# Patient Record
Sex: MALE | Race: WHITE | NOT HISPANIC OR LATINO
[De-identification: names, ages, dates, MRNs, and addresses within clinical notes are randomized per-mention and may not be internally consistent; named-entity substitution may affect disease eponyms.]

---

## 2017-02-03 ENCOUNTER — LABORATORY RESULT (OUTPATIENT)
Age: 62
End: 2017-02-03

## 2017-02-03 ENCOUNTER — APPOINTMENT (OUTPATIENT)
Dept: INTERNAL MEDICINE | Facility: CLINIC | Age: 62
End: 2017-02-03

## 2017-02-03 VITALS
TEMPERATURE: 97.4 F | DIASTOLIC BLOOD PRESSURE: 79 MMHG | RESPIRATION RATE: 14 BRPM | SYSTOLIC BLOOD PRESSURE: 135 MMHG | WEIGHT: 205 LBS | HEIGHT: 70 IN | BODY MASS INDEX: 29.35 KG/M2 | HEART RATE: 80 BPM | OXYGEN SATURATION: 97 %

## 2017-02-03 DIAGNOSIS — Z11.4 ENCOUNTER FOR SCREENING FOR HUMAN IMMUNODEFICIENCY VIRUS [HIV]: ICD-10-CM

## 2017-02-07 LAB
ALBUMIN SERPL ELPH-MCNC: 4.1 G/DL
ALP BLD-CCNC: 45 U/L
ALT SERPL-CCNC: 19 U/L
ANION GAP SERPL CALC-SCNC: 16 MMOL/L
AST SERPL-CCNC: 20 U/L
BASOPHILS # BLD AUTO: 0 K/UL
BASOPHILS NFR BLD AUTO: 0 %
BILIRUB SERPL-MCNC: 0.6 MG/DL
BUN SERPL-MCNC: 17 MG/DL
CALCIUM SERPL-MCNC: 9.2 MG/DL
CHLORIDE SERPL-SCNC: 101 MMOL/L
CHOLEST SERPL-MCNC: 155 MG/DL
CHOLEST/HDLC SERPL: 2.5 RATIO
CO2 SERPL-SCNC: 22 MMOL/L
CREAT SERPL-MCNC: 1.06 MG/DL
CREAT SPEC-SCNC: 131 MG/DL
EOSINOPHIL # BLD AUTO: 0.12 K/UL
EOSINOPHIL NFR BLD AUTO: 3 %
GLUCOSE SERPL-MCNC: 108 MG/DL
HBA1C MFR BLD HPLC: 5.9 %
HCT VFR BLD CALC: 42.3 %
HDLC SERPL-MCNC: 63 MG/DL
HGB BLD-MCNC: 13.8 G/DL
HIV1+2 AB SPEC QL IA.RAPID: NONREACTIVE
LDLC SERPL CALC-MCNC: 75 MG/DL
LYMPHOCYTES # BLD AUTO: 1.88 K/UL
LYMPHOCYTES NFR BLD AUTO: 46 %
MAN DIFF?: NORMAL
MCHC RBC-ENTMCNC: 28.3 PG
MCHC RBC-ENTMCNC: 32.6 GM/DL
MCV RBC AUTO: 86.7 FL
MICROALBUMIN 24H UR DL<=1MG/L-MCNC: 0.8 MG/DL
MICROALBUMIN/CREAT 24H UR-RTO: 6 UG/MG
MONOCYTES # BLD AUTO: 0.37 K/UL
MONOCYTES NFR BLD AUTO: 9 %
NEUTROPHILS # BLD AUTO: 1.71 K/UL
NEUTROPHILS NFR BLD AUTO: 42 %
PLATELET # BLD AUTO: 172 K/UL
POTASSIUM SERPL-SCNC: 4.2 MMOL/L
PROT SERPL-MCNC: 6.6 G/DL
RBC # BLD: 4.88 M/UL
RBC # FLD: 14.2 %
SODIUM SERPL-SCNC: 139 MMOL/L
TRIGL SERPL-MCNC: 85 MG/DL
WBC # FLD AUTO: 4.08 K/UL

## 2017-04-28 ENCOUNTER — APPOINTMENT (OUTPATIENT)
Dept: INTERNAL MEDICINE | Facility: CLINIC | Age: 62
End: 2017-04-28

## 2017-04-28 VITALS
SYSTOLIC BLOOD PRESSURE: 150 MMHG | OXYGEN SATURATION: 98 % | DIASTOLIC BLOOD PRESSURE: 87 MMHG | WEIGHT: 201 LBS | HEIGHT: 70 IN | RESPIRATION RATE: 14 BRPM | BODY MASS INDEX: 28.77 KG/M2 | HEART RATE: 66 BPM | TEMPERATURE: 97.7 F

## 2017-04-28 DIAGNOSIS — Z86.59 PERSONAL HISTORY OF OTHER MENTAL AND BEHAVIORAL DISORDERS: ICD-10-CM

## 2017-05-25 ENCOUNTER — APPOINTMENT (OUTPATIENT)
Dept: HEART AND VASCULAR | Facility: CLINIC | Age: 62
End: 2017-05-25

## 2017-05-25 VITALS
HEART RATE: 62 BPM | DIASTOLIC BLOOD PRESSURE: 70 MMHG | SYSTOLIC BLOOD PRESSURE: 110 MMHG | HEIGHT: 70 IN | WEIGHT: 192 LBS | BODY MASS INDEX: 27.49 KG/M2

## 2017-05-25 VITALS — WEIGHT: 202 LBS | BODY MASS INDEX: 28.98 KG/M2

## 2017-05-26 LAB
ANION GAP SERPL CALC-SCNC: 16 MMOL/L
BUN SERPL-MCNC: 21 MG/DL
CALCIUM SERPL-MCNC: 9.9 MG/DL
CHLORIDE SERPL-SCNC: 102 MMOL/L
CHOLEST SERPL-MCNC: 179 MG/DL
CHOLEST/HDLC SERPL: 3 RATIO
CO2 SERPL-SCNC: 22 MMOL/L
CREAT SERPL-MCNC: 1.17 MG/DL
GLUCOSE SERPL-MCNC: 120 MG/DL
HBA1C MFR BLD HPLC: 5.9 %
HDLC SERPL-MCNC: 59 MG/DL
LDLC SERPL CALC-MCNC: 97 MG/DL
POTASSIUM SERPL-SCNC: 4.4 MMOL/L
SODIUM SERPL-SCNC: 140 MMOL/L
TRIGL SERPL-MCNC: 115 MG/DL

## 2017-06-28 ENCOUNTER — APPOINTMENT (OUTPATIENT)
Dept: INTERNAL MEDICINE | Facility: CLINIC | Age: 62
End: 2017-06-28

## 2017-06-28 VITALS
SYSTOLIC BLOOD PRESSURE: 130 MMHG | DIASTOLIC BLOOD PRESSURE: 80 MMHG | OXYGEN SATURATION: 97 % | TEMPERATURE: 97.4 F | RESPIRATION RATE: 14 BRPM | HEART RATE: 60 BPM | HEIGHT: 70 IN | BODY MASS INDEX: 28.63 KG/M2 | WEIGHT: 200 LBS

## 2017-06-28 DIAGNOSIS — R73.09 OTHER ABNORMAL GLUCOSE: ICD-10-CM

## 2017-09-28 ENCOUNTER — RX RENEWAL (OUTPATIENT)
Age: 62
End: 2017-09-28

## 2017-09-29 ENCOUNTER — RX RENEWAL (OUTPATIENT)
Age: 62
End: 2017-09-29

## 2017-11-10 ENCOUNTER — APPOINTMENT (OUTPATIENT)
Dept: HEART AND VASCULAR | Facility: CLINIC | Age: 62
End: 2017-11-10
Payer: COMMERCIAL

## 2017-11-10 VITALS
BODY MASS INDEX: 29.49 KG/M2 | HEIGHT: 70 IN | SYSTOLIC BLOOD PRESSURE: 110 MMHG | WEIGHT: 206 LBS | HEART RATE: 63 BPM | DIASTOLIC BLOOD PRESSURE: 60 MMHG

## 2017-11-10 DIAGNOSIS — R07.89 OTHER CHEST PAIN: ICD-10-CM

## 2017-11-10 PROCEDURE — 99214 OFFICE O/P EST MOD 30 MIN: CPT | Mod: 25

## 2017-11-10 PROCEDURE — 93000 ELECTROCARDIOGRAM COMPLETE: CPT

## 2017-11-12 PROBLEM — R07.89 CHEST DISCOMFORT: Status: ACTIVE | Noted: 2017-11-12

## 2017-11-12 LAB
ANION GAP SERPL CALC-SCNC: 18 MMOL/L
BUN SERPL-MCNC: 22 MG/DL
CALCIUM SERPL-MCNC: 9.5 MG/DL
CHLORIDE SERPL-SCNC: 102 MMOL/L
CHOLEST SERPL-MCNC: 164 MG/DL
CHOLEST/HDLC SERPL: 2.9 RATIO
CO2 SERPL-SCNC: 22 MMOL/L
CREAT SERPL-MCNC: 1.22 MG/DL
GLUCOSE SERPL-MCNC: 123 MG/DL
HBA1C MFR BLD HPLC: 5.8 %
HDLC SERPL-MCNC: 56 MG/DL
HIV1+2 AB SPEC QL IA.RAPID: NONREACTIVE
LDLC SERPL CALC-MCNC: 90 MG/DL
POTASSIUM SERPL-SCNC: 4.4 MMOL/L
SODIUM SERPL-SCNC: 142 MMOL/L
TRIGL SERPL-MCNC: 91 MG/DL

## 2017-12-10 ENCOUNTER — TRANSCRIPTION ENCOUNTER (OUTPATIENT)
Age: 62
End: 2017-12-10

## 2017-12-15 ENCOUNTER — APPOINTMENT (OUTPATIENT)
Dept: INTERNAL MEDICINE | Facility: CLINIC | Age: 62
End: 2017-12-15
Payer: COMMERCIAL

## 2017-12-15 VITALS
WEIGHT: 206 LBS | SYSTOLIC BLOOD PRESSURE: 130 MMHG | HEART RATE: 58 BPM | OXYGEN SATURATION: 97 % | HEIGHT: 70 IN | RESPIRATION RATE: 14 BRPM | DIASTOLIC BLOOD PRESSURE: 70 MMHG | BODY MASS INDEX: 29.49 KG/M2

## 2017-12-15 PROCEDURE — 99396 PREV VISIT EST AGE 40-64: CPT | Mod: 25

## 2017-12-15 PROCEDURE — 36415 COLL VENOUS BLD VENIPUNCTURE: CPT

## 2017-12-15 PROCEDURE — 90471 IMMUNIZATION ADMIN: CPT

## 2017-12-15 PROCEDURE — 90686 IIV4 VACC NO PRSV 0.5 ML IM: CPT

## 2017-12-17 LAB
HCV AB SER QL: NONREACTIVE
HCV S/CO RATIO: 0.25 S/CO

## 2018-01-10 ENCOUNTER — APPOINTMENT (OUTPATIENT)
Dept: OPHTHALMOLOGY | Facility: CLINIC | Age: 63
End: 2018-01-10
Payer: COMMERCIAL

## 2018-01-10 DIAGNOSIS — H43.391 OTHER VITREOUS OPACITIES, RIGHT EYE: ICD-10-CM

## 2018-01-10 DIAGNOSIS — H26.9 UNSPECIFIED CATARACT: ICD-10-CM

## 2018-01-10 PROCEDURE — 92225: CPT | Mod: RT

## 2018-01-10 PROCEDURE — 92014 COMPRE OPH EXAM EST PT 1/>: CPT

## 2018-02-05 ENCOUNTER — RX RENEWAL (OUTPATIENT)
Age: 63
End: 2018-02-05

## 2018-03-16 DIAGNOSIS — Z92.89 PERSONAL HISTORY OF OTHER MEDICAL TREATMENT: ICD-10-CM

## 2018-03-19 PROBLEM — Z92.89 HISTORY OF STRESS TEST: Status: ACTIVE | Noted: 2018-03-19

## 2018-03-30 ENCOUNTER — APPOINTMENT (OUTPATIENT)
Dept: HEART AND VASCULAR | Facility: CLINIC | Age: 63
End: 2018-03-30
Payer: COMMERCIAL

## 2018-03-30 VITALS
WEIGHT: 204 LBS | DIASTOLIC BLOOD PRESSURE: 80 MMHG | BODY MASS INDEX: 29.2 KG/M2 | SYSTOLIC BLOOD PRESSURE: 140 MMHG | HEART RATE: 57 BPM | HEIGHT: 70 IN

## 2018-03-30 VITALS — SYSTOLIC BLOOD PRESSURE: 120 MMHG | DIASTOLIC BLOOD PRESSURE: 84 MMHG

## 2018-03-30 DIAGNOSIS — I51.89 OTHER ILL-DEFINED HEART DISEASES: ICD-10-CM

## 2018-03-30 PROCEDURE — 99213 OFFICE O/P EST LOW 20 MIN: CPT

## 2018-03-30 PROCEDURE — 93000 ELECTROCARDIOGRAM COMPLETE: CPT

## 2018-03-30 PROCEDURE — 36415 COLL VENOUS BLD VENIPUNCTURE: CPT

## 2018-03-30 RX ORDER — MUPIROCIN 20 MG/G
2 OINTMENT TOPICAL
Qty: 22 | Refills: 0 | Status: DISCONTINUED | COMMUNITY
Start: 2017-11-16

## 2018-04-01 LAB
ANION GAP SERPL CALC-SCNC: 13 MMOL/L
BUN SERPL-MCNC: 17 MG/DL
CALCIUM SERPL-MCNC: 9.7 MG/DL
CHLORIDE SERPL-SCNC: 100 MMOL/L
CHOLEST SERPL-MCNC: 170 MG/DL
CHOLEST/HDLC SERPL: 3 RATIO
CO2 SERPL-SCNC: 27 MMOL/L
CREAT SERPL-MCNC: 1.06 MG/DL
GLUCOSE SERPL-MCNC: 118 MG/DL
HBA1C MFR BLD HPLC: 5.9 %
HDLC SERPL-MCNC: 57 MG/DL
HIV1+2 AB SPEC QL IA.RAPID: NONREACTIVE
LDLC SERPL CALC-MCNC: 85 MG/DL
POTASSIUM SERPL-SCNC: 4.4 MMOL/L
SODIUM SERPL-SCNC: 140 MMOL/L
TRIGL SERPL-MCNC: 142 MG/DL

## 2018-05-22 ENCOUNTER — APPOINTMENT (OUTPATIENT)
Dept: INTERNAL MEDICINE | Facility: CLINIC | Age: 63
End: 2018-05-22
Payer: COMMERCIAL

## 2018-05-22 VITALS
WEIGHT: 200 LBS | TEMPERATURE: 97.6 F | DIASTOLIC BLOOD PRESSURE: 60 MMHG | HEART RATE: 69 BPM | BODY MASS INDEX: 28.63 KG/M2 | HEIGHT: 70 IN | OXYGEN SATURATION: 98 % | RESPIRATION RATE: 14 BRPM | SYSTOLIC BLOOD PRESSURE: 120 MMHG

## 2018-05-22 PROCEDURE — 99214 OFFICE O/P EST MOD 30 MIN: CPT

## 2018-05-22 NOTE — PLAN
[FreeTextEntry1] : \par Back pain - exercises reviewed in detil and handout gien\par tylenol and aleve mainstay of prescription tx\par  tramadol for severe pain = reviewed potential aes \par \par BP is controlled and contine current regimen

## 2018-05-22 NOTE — REVIEW OF SYSTEMS
[Back Pain] : back pain [Negative] : Integumentary [Joint Pain] : no joint pain [de-identified] : as above

## 2018-05-22 NOTE — HISTORY OF PRESENT ILLNESS
[FreeTextEntry1] : back pain [de-identified] : right sided pain radiating down buttock to right leg to foot.\par saw chiropractor - did not help\par this began 10 d ago. throbbing tingling . numb, pain.  pain at night as well.  nothing on left.\par deniese any trauma,  never had this before.\par Taking aleve bid right now. - does not help

## 2018-05-22 NOTE — PHYSICAL EXAM
[No Acute Distress] : no acute distress [Well Nourished] : well nourished [Well Developed] : well developed [Well-Appearing] : well-appearing

## 2018-07-11 ENCOUNTER — APPOINTMENT (OUTPATIENT)
Dept: MRI IMAGING | Facility: CLINIC | Age: 63
End: 2018-07-11
Payer: COMMERCIAL

## 2018-07-11 ENCOUNTER — OUTPATIENT (OUTPATIENT)
Dept: OUTPATIENT SERVICES | Facility: HOSPITAL | Age: 63
LOS: 1 days | End: 2018-07-11
Payer: COMMERCIAL

## 2018-07-11 PROCEDURE — 72148 MRI LUMBAR SPINE W/O DYE: CPT

## 2018-07-11 PROCEDURE — 72148 MRI LUMBAR SPINE W/O DYE: CPT | Mod: 26

## 2018-07-12 ENCOUNTER — APPOINTMENT (OUTPATIENT)
Dept: NEUROLOGY | Facility: CLINIC | Age: 63
End: 2018-07-12
Payer: COMMERCIAL

## 2018-07-12 VITALS
DIASTOLIC BLOOD PRESSURE: 103 MMHG | BODY MASS INDEX: 28.63 KG/M2 | SYSTOLIC BLOOD PRESSURE: 162 MMHG | WEIGHT: 200 LBS | HEIGHT: 70 IN | HEART RATE: 85 BPM | OXYGEN SATURATION: 95 %

## 2018-07-12 VITALS
HEIGHT: 70 IN | OXYGEN SATURATION: 94 % | WEIGHT: 200 LBS | DIASTOLIC BLOOD PRESSURE: 115 MMHG | SYSTOLIC BLOOD PRESSURE: 166 MMHG | HEART RATE: 86 BPM | BODY MASS INDEX: 28.63 KG/M2

## 2018-07-12 PROCEDURE — 99205 OFFICE O/P NEW HI 60 MIN: CPT

## 2018-07-18 ENCOUNTER — OUTPATIENT (OUTPATIENT)
Dept: OUTPATIENT SERVICES | Facility: HOSPITAL | Age: 63
LOS: 1 days | End: 2018-07-18

## 2018-07-27 ENCOUNTER — APPOINTMENT (OUTPATIENT)
Dept: MRI IMAGING | Facility: CLINIC | Age: 63
End: 2018-07-27

## 2018-08-03 ENCOUNTER — APPOINTMENT (OUTPATIENT)
Dept: HEART AND VASCULAR | Facility: CLINIC | Age: 63
End: 2018-08-03
Payer: COMMERCIAL

## 2018-08-03 VITALS
DIASTOLIC BLOOD PRESSURE: 70 MMHG | HEIGHT: 70 IN | HEART RATE: 78 BPM | SYSTOLIC BLOOD PRESSURE: 128 MMHG | WEIGHT: 200 LBS | BODY MASS INDEX: 28.63 KG/M2

## 2018-08-03 PROCEDURE — 93000 ELECTROCARDIOGRAM COMPLETE: CPT

## 2018-08-03 PROCEDURE — 99214 OFFICE O/P EST MOD 30 MIN: CPT | Mod: 25

## 2018-08-03 RX ORDER — TRAMADOL HYDROCHLORIDE 50 MG/1
50 TABLET, COATED ORAL
Qty: 20 | Refills: 0 | Status: DISCONTINUED | COMMUNITY
Start: 2018-05-22 | End: 2018-08-03

## 2018-09-26 ENCOUNTER — APPOINTMENT (OUTPATIENT)
Dept: NEUROLOGY | Facility: CLINIC | Age: 63
End: 2018-09-26
Payer: COMMERCIAL

## 2018-09-26 VITALS
WEIGHT: 200 LBS | DIASTOLIC BLOOD PRESSURE: 79 MMHG | SYSTOLIC BLOOD PRESSURE: 175 MMHG | HEIGHT: 70 IN | OXYGEN SATURATION: 98 % | HEART RATE: 72 BPM | BODY MASS INDEX: 28.63 KG/M2

## 2018-09-26 PROCEDURE — 99214 OFFICE O/P EST MOD 30 MIN: CPT | Mod: 25

## 2018-09-26 PROCEDURE — 95886 MUSC TEST DONE W/N TEST COMP: CPT | Mod: 59

## 2018-09-26 PROCEDURE — 76882 US LMTD JT/FCL EVL NVASC XTR: CPT | Mod: 59

## 2018-09-26 PROCEDURE — 95912 NRV CNDJ TEST 11-12 STUDIES: CPT

## 2018-10-10 ENCOUNTER — OUTPATIENT (OUTPATIENT)
Dept: OUTPATIENT SERVICES | Facility: HOSPITAL | Age: 63
LOS: 1 days | End: 2018-10-10
Payer: COMMERCIAL

## 2018-10-10 ENCOUNTER — APPOINTMENT (OUTPATIENT)
Dept: MRI IMAGING | Facility: HOSPITAL | Age: 63
End: 2018-10-10
Payer: COMMERCIAL

## 2018-10-10 PROCEDURE — 72146 MRI CHEST SPINE W/O DYE: CPT

## 2018-10-10 PROCEDURE — 72146 MRI CHEST SPINE W/O DYE: CPT | Mod: 26

## 2018-11-22 ENCOUNTER — RX RENEWAL (OUTPATIENT)
Age: 63
End: 2018-11-22

## 2018-12-14 ENCOUNTER — APPOINTMENT (OUTPATIENT)
Dept: NEUROLOGY | Facility: CLINIC | Age: 63
End: 2018-12-14
Payer: COMMERCIAL

## 2018-12-14 VITALS
WEIGHT: 200 LBS | DIASTOLIC BLOOD PRESSURE: 70 MMHG | OXYGEN SATURATION: 97 % | HEIGHT: 70 IN | BODY MASS INDEX: 28.63 KG/M2 | TEMPERATURE: 97.2 F | SYSTOLIC BLOOD PRESSURE: 110 MMHG | HEART RATE: 65 BPM

## 2018-12-14 DIAGNOSIS — G57.31 LESION OF LATERAL POPLITEAL NERVE, RIGHT LOWER LIMB: ICD-10-CM

## 2018-12-14 PROCEDURE — 99214 OFFICE O/P EST MOD 30 MIN: CPT

## 2019-03-05 ENCOUNTER — TRANSCRIPTION ENCOUNTER (OUTPATIENT)
Age: 64
End: 2019-03-05

## 2019-03-08 ENCOUNTER — APPOINTMENT (OUTPATIENT)
Dept: HEART AND VASCULAR | Facility: CLINIC | Age: 64
End: 2019-03-08
Payer: COMMERCIAL

## 2019-03-08 VITALS
DIASTOLIC BLOOD PRESSURE: 80 MMHG | HEART RATE: 62 BPM | WEIGHT: 200 LBS | HEIGHT: 70 IN | BODY MASS INDEX: 28.63 KG/M2 | SYSTOLIC BLOOD PRESSURE: 130 MMHG

## 2019-03-08 PROCEDURE — 93015 CV STRESS TEST SUPVJ I&R: CPT

## 2019-03-08 PROCEDURE — 99212 OFFICE O/P EST SF 10 MIN: CPT | Mod: 25

## 2019-03-11 ENCOUNTER — RX RENEWAL (OUTPATIENT)
Age: 64
End: 2019-03-11

## 2019-03-12 LAB
ANION GAP SERPL CALC-SCNC: 15 MMOL/L
BUN SERPL-MCNC: 22 MG/DL
CALCIUM SERPL-MCNC: 9.9 MG/DL
CHLORIDE SERPL-SCNC: 101 MMOL/L
CHOLEST SERPL-MCNC: 158 MG/DL
CHOLEST/HDLC SERPL: 2.9 RATIO
CO2 SERPL-SCNC: 24 MMOL/L
CREAT SERPL-MCNC: 1.14 MG/DL
GLUCOSE SERPL-MCNC: 108 MG/DL
HBA1C MFR BLD HPLC: 5.9 %
HDLC SERPL-MCNC: 54 MG/DL
HIV1+2 AB SPEC QL IA.RAPID: NONREACTIVE
LDLC SERPL CALC-MCNC: 75 MG/DL
POTASSIUM SERPL-SCNC: 4.6 MMOL/L
SODIUM SERPL-SCNC: 140 MMOL/L
TRIGL SERPL-MCNC: 144 MG/DL

## 2019-03-13 NOTE — HISTORY OF PRESENT ILLNESS
[FreeTextEntry1] : The patient exercises at the gym 3 times a week. He climbs subway stairs without chest pain or dyspnea. He exercises on the elliptical  for 40 minutes. His diet is fair.

## 2019-03-13 NOTE — PHYSICAL EXAM
[Normal Conjunctiva] : the conjunctiva exhibited no abnormalities [Eyelids - No Xanthelasma] : the eyelids demonstrated no xanthelasmas [Normal Oral Mucosa] : normal oral mucosa [No Oral Pallor] : no oral pallor [No Oral Cyanosis] : no oral cyanosis [Normal Jugular Venous A Waves Present] : normal jugular venous A waves present [Normal Jugular Venous V Waves Present] : normal jugular venous V waves present [No Jugular Venous Mooney A Waves] : no jugular venous mooney A waves [Respiration, Rhythm And Depth] : normal respiratory rhythm and effort [Exaggerated Use Of Accessory Muscles For Inspiration] : no accessory muscle use [Auscultation Breath Sounds / Voice Sounds] : lungs were clear to auscultation bilaterally [Heart Rate And Rhythm] : heart rate and rhythm were normal [Heart Sounds] : normal S1 and S2 [Murmurs] : no murmurs present [Arterial Pulses Normal] : the arterial pulses were normal [FreeTextEntry1] : No carotid bruit [Abdomen Soft] : soft [Abdomen Tenderness] : non-tender [Abdomen Mass (___ Cm)] : no abdominal mass palpated [Abnormal Walk] : normal gait [Gait - Sufficient For Exercise Testing] : the gait was sufficient for exercise testing [Nail Clubbing] : no clubbing of the fingernails [Cyanosis, Localized] : no localized cyanosis [Petechial Hemorrhages (___cm)] : no petechial hemorrhages [] : no ischemic changes

## 2019-03-13 NOTE — DISCUSSION/SUMMARY
[FreeTextEntry1] : The patient has hypercholesterolemia hypertension and a family history of coronary artery disease. He is physically active and without symptoms. His EKG has been abnormal. The patient's calcium score has been elevated. He is at risk for ischemia. The patient's stress test was normal. Significant CAD is unlikely. His blood pressure is controlled. He will continue his current medication.

## 2019-03-20 RX ORDER — DIAZEPAM 5 MG/1
5 TABLET ORAL
Qty: 30 | Refills: 0 | Status: ACTIVE | COMMUNITY
Start: 2018-06-06 | End: 1900-01-01

## 2019-03-26 ENCOUNTER — TRANSCRIPTION ENCOUNTER (OUTPATIENT)
Age: 64
End: 2019-03-26

## 2019-04-02 ENCOUNTER — OUTPATIENT (OUTPATIENT)
Dept: OUTPATIENT SERVICES | Facility: HOSPITAL | Age: 64
LOS: 1 days | End: 2019-04-02
Payer: COMMERCIAL

## 2019-04-02 ENCOUNTER — APPOINTMENT (OUTPATIENT)
Dept: NEUROLOGY | Facility: CLINIC | Age: 64
End: 2019-04-02
Payer: COMMERCIAL

## 2019-04-02 VITALS
BODY MASS INDEX: 28.63 KG/M2 | OXYGEN SATURATION: 95 % | SYSTOLIC BLOOD PRESSURE: 159 MMHG | HEIGHT: 70 IN | WEIGHT: 200 LBS | HEART RATE: 83 BPM | TEMPERATURE: 97.6 F | DIASTOLIC BLOOD PRESSURE: 80 MMHG

## 2019-04-02 PROCEDURE — 72110 X-RAY EXAM L-2 SPINE 4/>VWS: CPT

## 2019-04-02 PROCEDURE — 99214 OFFICE O/P EST MOD 30 MIN: CPT

## 2019-04-02 PROCEDURE — 72110 X-RAY EXAM L-2 SPINE 4/>VWS: CPT | Mod: 26

## 2019-04-02 NOTE — PHYSICAL EXAM
[General Appearance - Alert] : alert [General Appearance - In No Acute Distress] : in no acute distress [Oriented To Time, Place, And Person] : oriented to person, place, and time [Impaired Insight] : insight and judgment were intact [Affect] : the affect was normal [Motor Tone] : muscle tone was normal in all four extremities [Involuntary Movements] : no involuntary movements were seen [No Muscle Atrophy] : normal bulk in all four extremities [Abnormal Walk] : normal gait [2+] : Brachioradialis left 2+ [3+] : Ankle jerk left 3+ [___] : [unfilled] ~Ubeats present on the right [___] : [unfilled] ~Ubeats present on the left [FreeTextEntry6] : right ankle dorsiflexion 4+/5, eversion 4+, inversion 5, EHL/EDB 4+, otherwise 5/5 symmetric UE and LE [FreeTextEntry7] : no significant difference in LT/PP b/l LE [FreeTextEntry8] : can walk on toes, some difficulty with heel walking on right [FreeTextEntry1] : SHAILA and FAIR negative on right; SLR negative b/l; some tendneress of right piriformis

## 2019-04-02 NOTE — HISTORY OF PRESENT ILLNESS
[FreeTextEntry1] : For the past couple of weeks he's been having severe pain from the right buttocks down to the right calf. \par \par He started prednisone a few days ago and thinks it may be helping somewhat - can walk a little longer without having the pain. \par \par MRI L spine reviewed again - perhaps mild b/l L5 nerve root impingement but not severe\par more significant abnormalities in upper lumbar spine

## 2019-04-02 NOTE — ASSESSMENT
[FreeTextEntry1] : symptoms most consistent with right L5/S1 radiculopathy although no definite associated lesion on most recent MRI \par x-rays L spine with flexion and extension\par refer to Dr. Sands for steroid injection\par f/u 2-3 months\par

## 2019-04-04 ENCOUNTER — APPOINTMENT (OUTPATIENT)
Dept: INTERNAL MEDICINE | Facility: CLINIC | Age: 64
End: 2019-04-04
Payer: COMMERCIAL

## 2019-04-04 VITALS
OXYGEN SATURATION: 97 % | DIASTOLIC BLOOD PRESSURE: 80 MMHG | BODY MASS INDEX: 28.63 KG/M2 | TEMPERATURE: 97.6 F | RESPIRATION RATE: 14 BRPM | WEIGHT: 200 LBS | HEART RATE: 73 BPM | SYSTOLIC BLOOD PRESSURE: 121 MMHG | HEIGHT: 70 IN

## 2019-04-04 PROCEDURE — 99214 OFFICE O/P EST MOD 30 MIN: CPT

## 2019-04-04 NOTE — PLAN
[FreeTextEntry1] : BP controlled\par Rec not using opioids for pain control\par PRN use of benzo at night\par  encourage exercises learned at PT\par f/up for CPE

## 2019-04-04 NOTE — HISTORY OF PRESENT ILLNESS
[FreeTextEntry1] : right leg pain [de-identified] : \par Having a recurrence of his sciatica. Tried medical massage, PT, about to start accupuncture.  First began last year.  Right sided.  Has been seeing Dr Estrada. MRI without significant findings, still being worked up.  \par He reports he used to take oxycontin. THen began taking aleve PM.  \par Diazepam helped him sleep.  \par His brother just prescribed prednisone -  he is on a 12 day taper.  No significant improvement yet.

## 2019-04-05 ENCOUNTER — TRANSCRIPTION ENCOUNTER (OUTPATIENT)
Age: 64
End: 2019-04-05

## 2019-04-09 ENCOUNTER — TRANSCRIPTION ENCOUNTER (OUTPATIENT)
Age: 64
End: 2019-04-09

## 2019-06-07 ENCOUNTER — APPOINTMENT (OUTPATIENT)
Dept: NEUROLOGY | Facility: CLINIC | Age: 64
End: 2019-06-07
Payer: COMMERCIAL

## 2019-06-07 VITALS
BODY MASS INDEX: 28.63 KG/M2 | HEIGHT: 70 IN | OXYGEN SATURATION: 97 % | WEIGHT: 200 LBS | HEART RATE: 70 BPM | SYSTOLIC BLOOD PRESSURE: 179 MMHG | TEMPERATURE: 99.2 F | DIASTOLIC BLOOD PRESSURE: 92 MMHG

## 2019-06-07 PROCEDURE — 99214 OFFICE O/P EST MOD 30 MIN: CPT

## 2019-06-07 NOTE — ASSESSMENT
[FreeTextEntry1] : Still has right foot weakness but pretty mild and not worse\par Pain has improved\par Agree with pain management, if still symptomatic after that consider spine surgery referral\par

## 2019-06-07 NOTE — HISTORY OF PRESENT ILLNESS
[FreeTextEntry1] : Has had some flare ups of radicular pain down legs, more so on the right but also on the left at times. Overall not as bad as it was before. He did an oral steroid course which helped temporarily. He has not been table to see pain management yet due to insurance issues but is looking to see someone at [facility]. \par \par 10 pt review of systems performed and reviewed with patient\par Past medical history, surgical history, social history, and family history reviewed with patient\par See scanned document for details\par

## 2019-06-07 NOTE — PHYSICAL EXAM
[General Appearance - In No Acute Distress] : in no acute distress [Oriented To Time, Place, And Person] : oriented to person, place, and time [General Appearance - Alert] : alert [Affect] : the affect was normal [Impaired Insight] : insight and judgment were intact [Involuntary Movements] : no involuntary movements were seen [Motor Tone] : muscle tone was normal in all four extremities [No Muscle Atrophy] : normal bulk in all four extremities [Abnormal Walk] : normal gait [2+] : Brachioradialis left 2+ [___] : [unfilled] ~Ubeats present on the right [3+] : Ankle jerk left 3+ [___] : [unfilled] ~Ubeats present on the left [FreeTextEntry7] : no significant difference in LT/PP b/l LE [FreeTextEntry6] : right ankle dorsiflexion 4+/5, eversion 4+, inversion 5, EHL/EDB 4+, otherwise 5/5 symmetric UE and LE [FreeTextEntry8] : can walk on toes, some difficulty with heel walking on right

## 2019-06-13 ENCOUNTER — TRANSCRIPTION ENCOUNTER (OUTPATIENT)
Age: 64
End: 2019-06-13

## 2019-06-26 ENCOUNTER — APPOINTMENT (OUTPATIENT)
Dept: INTERNAL MEDICINE | Facility: CLINIC | Age: 64
End: 2019-06-26

## 2019-07-19 ENCOUNTER — TRANSCRIPTION ENCOUNTER (OUTPATIENT)
Age: 64
End: 2019-07-19

## 2019-07-24 ENCOUNTER — APPOINTMENT (OUTPATIENT)
Dept: NEUROLOGY | Facility: CLINIC | Age: 64
End: 2019-07-24
Payer: COMMERCIAL

## 2019-07-24 DIAGNOSIS — R29.2 ABNORMAL REFLEX: ICD-10-CM

## 2019-07-24 PROCEDURE — 99214 OFFICE O/P EST MOD 30 MIN: CPT

## 2019-07-24 NOTE — PHYSICAL EXAM
[General Appearance - Alert] : alert [Oriented To Time, Place, And Person] : oriented to person, place, and time [General Appearance - In No Acute Distress] : in no acute distress [Impaired Insight] : insight and judgment were intact [Affect] : the affect was normal [No Muscle Atrophy] : normal bulk in all four extremities [Involuntary Movements] : no involuntary movements were seen [Motor Tone] : muscle tone was normal in all four extremities [Abnormal Walk] : normal gait [2+] : Triceps left 2+ [___] : [unfilled] ~Ubeats present on the right [3+] : Ankle jerk right 3+ [___] : [unfilled] ~Ubeats present on the left [FreeTextEntry6] : LE 5/5 symmetric [FreeTextEntry8] : can walk on toes, some difficulty with heel walking on right [FreeTextEntry7] : no significant difference in LT/PP b/l LE

## 2019-07-24 NOTE — ASSESSMENT
[FreeTextEntry1] : Back is better\par Right foot weakness better although still cannot walk on heels\par No radial nerve palsy after humeral fracture\par Still not clear what's causing hyperreflexia; reviewed MRI T spine again - no cord compression\par However not likely related to symptoms \par Return 3-4 months
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
## 2019-08-22 ENCOUNTER — TRANSCRIPTION ENCOUNTER (OUTPATIENT)
Age: 64
End: 2019-08-22

## 2019-09-06 ENCOUNTER — APPOINTMENT (OUTPATIENT)
Dept: INTERNAL MEDICINE | Facility: CLINIC | Age: 64
End: 2019-09-06
Payer: COMMERCIAL

## 2019-09-06 VITALS
SYSTOLIC BLOOD PRESSURE: 150 MMHG | BODY MASS INDEX: 28.63 KG/M2 | DIASTOLIC BLOOD PRESSURE: 90 MMHG | RESPIRATION RATE: 14 BRPM | HEART RATE: 78 BPM | OXYGEN SATURATION: 96 % | WEIGHT: 200 LBS | HEIGHT: 70 IN | TEMPERATURE: 98.2 F

## 2019-09-06 PROCEDURE — 99214 OFFICE O/P EST MOD 30 MIN: CPT

## 2019-09-06 NOTE — PLAN
[FreeTextEntry1] : Pain management / sciatica- rx for percocet  -reviewed how to take it and potential aes'  understands not to use more than once every few days. \par \par \par HTN   - upon recehceking  controlled - continue on lisinopril 10mg daily  \par \par f/up with pain management
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
## 2019-09-10 ENCOUNTER — RX RENEWAL (OUTPATIENT)
Age: 64
End: 2019-09-10

## 2019-09-18 ENCOUNTER — TRANSCRIPTION ENCOUNTER (OUTPATIENT)
Age: 64
End: 2019-09-18

## 2019-09-20 ENCOUNTER — NON-APPOINTMENT (OUTPATIENT)
Age: 64
End: 2019-09-20

## 2019-09-20 ENCOUNTER — APPOINTMENT (OUTPATIENT)
Dept: HEART AND VASCULAR | Facility: CLINIC | Age: 64
End: 2019-09-20
Payer: COMMERCIAL

## 2019-09-20 VITALS — SYSTOLIC BLOOD PRESSURE: 124 MMHG | DIASTOLIC BLOOD PRESSURE: 76 MMHG

## 2019-09-20 VITALS
DIASTOLIC BLOOD PRESSURE: 90 MMHG | WEIGHT: 202 LBS | BODY MASS INDEX: 28.92 KG/M2 | SYSTOLIC BLOOD PRESSURE: 136 MMHG | HEIGHT: 70 IN

## 2019-09-20 DIAGNOSIS — R42 DIZZINESS AND GIDDINESS: ICD-10-CM

## 2019-09-20 PROCEDURE — 99214 OFFICE O/P EST MOD 30 MIN: CPT | Mod: 25

## 2019-09-20 PROCEDURE — 93000 ELECTROCARDIOGRAM COMPLETE: CPT

## 2019-09-20 RX ORDER — PREDNISONE 50 MG/1
TABLET ORAL
Refills: 0 | Status: DISCONTINUED | COMMUNITY
End: 2019-09-20

## 2019-09-20 RX ORDER — DOXYCYCLINE HYCLATE 100 MG/1
100 TABLET ORAL
Qty: 42 | Refills: 0 | Status: DISCONTINUED | COMMUNITY
End: 2019-09-20
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
## 2019-09-22 NOTE — DISCUSSION/SUMMARY
[FreeTextEntry1] : The patient has hypertension and hypercholesterolemia. He is physically active without chest pain or dyspnea. His blood pressure is controlled as is his cholesterol. The EKG is normal. He will continue his current medication. He defers a calcium score until next year.

## 2019-09-22 NOTE — PHYSICAL EXAM
[Eyelids - No Xanthelasma] : the eyelids demonstrated no xanthelasmas [Normal Conjunctiva] : the conjunctiva exhibited no abnormalities [Normal Oral Mucosa] : normal oral mucosa [No Oral Pallor] : no oral pallor [No Oral Cyanosis] : no oral cyanosis [Normal Jugular Venous V Waves Present] : normal jugular venous V waves present [Normal Jugular Venous A Waves Present] : normal jugular venous A waves present [No Jugular Venous Mooney A Waves] : no jugular venous mooney A waves [Auscultation Breath Sounds / Voice Sounds] : lungs were clear to auscultation bilaterally [Exaggerated Use Of Accessory Muscles For Inspiration] : no accessory muscle use [Respiration, Rhythm And Depth] : normal respiratory rhythm and effort [Heart Rate And Rhythm] : heart rate and rhythm were normal [Heart Sounds] : normal S1 and S2 [Murmurs] : no murmurs present [Arterial Pulses Normal] : the arterial pulses were normal [Abdomen Soft] : soft [FreeTextEntry1] : No carotid bruit [Abdomen Mass (___ Cm)] : no abdominal mass palpated [Abdomen Tenderness] : non-tender [Gait - Sufficient For Exercise Testing] : the gait was sufficient for exercise testing [Abnormal Walk] : normal gait [Nail Clubbing] : no clubbing of the fingernails [Cyanosis, Localized] : no localized cyanosis [Petechial Hemorrhages (___cm)] : no petechial hemorrhages [] : no ischemic changes

## 2019-11-01 ENCOUNTER — APPOINTMENT (OUTPATIENT)
Dept: NEUROLOGY | Facility: CLINIC | Age: 64
End: 2019-11-01
Payer: COMMERCIAL

## 2019-11-01 DIAGNOSIS — M48.00 SPINAL STENOSIS, SITE UNSPECIFIED: ICD-10-CM

## 2019-11-01 DIAGNOSIS — M54.16 RADICULOPATHY, LUMBAR REGION: ICD-10-CM

## 2019-11-01 DIAGNOSIS — M79.18 MYALGIA, OTHER SITE: ICD-10-CM

## 2019-11-01 PROCEDURE — 99214 OFFICE O/P EST MOD 30 MIN: CPT

## 2019-11-01 NOTE — PHYSICAL EXAM
[General Appearance - Alert] : alert [General Appearance - In No Acute Distress] : in no acute distress [Oriented To Time, Place, And Person] : oriented to person, place, and time [Impaired Insight] : insight and judgment were intact [Affect] : the affect was normal [Motor Tone] : muscle tone was normal in all four extremities [Involuntary Movements] : no involuntary movements were seen [No Muscle Atrophy] : normal bulk in all four extremities [Abnormal Walk] : normal gait [2+] : Brachioradialis left 2+ [3+] : Ankle jerk left 3+ [___] : [unfilled] ~Ubeats present on the right [___] : [unfilled] ~Ubeats present on the left [FreeTextEntry6] : LE 5/5 symmetric [FreeTextEntry7] : no significant difference in LT/PP b/l LE [FreeTextEntry8] : can walk on toes, some difficulty with heel walking on right

## 2019-11-01 NOTE — ASSESSMENT
[FreeTextEntry1] : Symptomatically improved\par Strength normal\par Discussed exercise\par Discussed returning for new / worsening symptoms

## 2019-11-01 NOTE — HISTORY OF PRESENT ILLNESS
[FreeTextEntry1] : He had two epidural steroids injections since last visit, the first did not work but the second did\par Now has no significant low back or leg pain\par No new symptoms\par Taking advantage of symptom relief to exercise

## 2020-05-22 ENCOUNTER — APPOINTMENT (OUTPATIENT)
Dept: HEART AND VASCULAR | Facility: CLINIC | Age: 65
End: 2020-05-22
Payer: COMMERCIAL

## 2020-05-22 DIAGNOSIS — Z78.9 OTHER SPECIFIED HEALTH STATUS: ICD-10-CM

## 2020-05-22 PROCEDURE — 99213 OFFICE O/P EST LOW 20 MIN: CPT | Mod: 95

## 2020-05-22 RX ORDER — OXYCODONE AND ACETAMINOPHEN 5; 325 MG/1; MG/1
5-325 TABLET ORAL
Qty: 12 | Refills: 0 | Status: DISCONTINUED | COMMUNITY
Start: 2019-09-06 | End: 2020-05-22

## 2020-05-22 NOTE — HISTORY OF PRESENT ILLNESS
[Home] : at home, [unfilled] , at the time of the visit. [Medical Office: ([facility])___] : at the medical office located in  [Verbal consent obtained from patient] : the patient, [unfilled] [FreeTextEntry1] : The patient has been working from home. He lifts weights and stretches with his  3 times a week for 40 minutes. His diet is fairly good. He has 3 alcoholic drinks a day. He sometimes cycles for an hour.

## 2020-05-22 NOTE — DISCUSSION/SUMMARY
[FreeTextEntry1] : The patient has a history of hypertension and hypercholesterolemia. His last blood pressure was good. He does not want to check his blood pressure at home. He will attempt to improve his diet. He is exercising regularly. He will attempt to reduce his intake of alcohol. The patient has a history of left ventricular hypertrophy. He will return for an echocardiogram and laboratory testing.

## 2020-06-03 ENCOUNTER — APPOINTMENT (OUTPATIENT)
Dept: HEART AND VASCULAR | Facility: CLINIC | Age: 65
End: 2020-06-03

## 2020-07-24 ENCOUNTER — RX RENEWAL (OUTPATIENT)
Age: 65
End: 2020-07-24

## 2020-08-04 ENCOUNTER — LABORATORY RESULT (OUTPATIENT)
Age: 65
End: 2020-08-04

## 2020-08-05 ENCOUNTER — APPOINTMENT (OUTPATIENT)
Dept: HEART AND VASCULAR | Facility: CLINIC | Age: 65
End: 2020-08-05
Payer: MEDICARE

## 2020-08-05 ENCOUNTER — NON-APPOINTMENT (OUTPATIENT)
Age: 65
End: 2020-08-05

## 2020-08-05 VITALS
WEIGHT: 204 LBS | HEIGHT: 70 IN | SYSTOLIC BLOOD PRESSURE: 140 MMHG | HEART RATE: 78 BPM | BODY MASS INDEX: 29.2 KG/M2 | DIASTOLIC BLOOD PRESSURE: 80 MMHG

## 2020-08-05 VITALS — TEMPERATURE: 97 F

## 2020-08-05 PROCEDURE — 36415 COLL VENOUS BLD VENIPUNCTURE: CPT

## 2020-08-05 PROCEDURE — G0402 INITIAL PREVENTIVE EXAM: CPT

## 2020-08-05 PROCEDURE — 93000 ELECTROCARDIOGRAM COMPLETE: CPT | Mod: 59

## 2020-08-05 PROCEDURE — 93306 TTE W/DOPPLER COMPLETE: CPT

## 2020-08-07 LAB
ALBUMIN SERPL ELPH-MCNC: 4.9 G/DL
ALP BLD-CCNC: 51 U/L
ALT SERPL-CCNC: 27 U/L
ANION GAP SERPL CALC-SCNC: 14 MMOL/L
AST SERPL-CCNC: 22 U/L
BASOPHILS # BLD AUTO: 0.06 K/UL
BASOPHILS NFR BLD AUTO: 1 %
BILIRUB SERPL-MCNC: 0.4 MG/DL
BUN SERPL-MCNC: 20 MG/DL
CALCIUM SERPL-MCNC: 9.7 MG/DL
CHLORIDE SERPL-SCNC: 98 MMOL/L
CHOLEST SERPL-MCNC: 174 MG/DL
CHOLEST/HDLC SERPL: 3.5 RATIO
CO2 SERPL-SCNC: 25 MMOL/L
CREAT SERPL-MCNC: 1.12 MG/DL
EOSINOPHIL # BLD AUTO: 0.2 K/UL
EOSINOPHIL NFR BLD AUTO: 3.3 %
ESTIMATED AVERAGE GLUCOSE: 126 MG/DL
GLUCOSE SERPL-MCNC: 116 MG/DL
HBA1C MFR BLD HPLC: 6 %
HCT VFR BLD CALC: 44.7 %
HDLC SERPL-MCNC: 50 MG/DL
HGB BLD-MCNC: 14.7 G/DL
HIV1+2 AB SPEC QL IA.RAPID: NONREACTIVE
HSV 1+2 IGG SER IA-IMP: NEGATIVE
HSV 1+2 IGG SER IA-IMP: POSITIVE
HSV1 IGG SER QL: 12.8 INDEX
HSV2 IGG SER QL: 0.28 INDEX
IMM GRANULOCYTES NFR BLD AUTO: 0.3 %
LDLC SERPL CALC-MCNC: 80 MG/DL
LYMPHOCYTES # BLD AUTO: 1.88 K/UL
LYMPHOCYTES NFR BLD AUTO: 30.9 %
MAN DIFF?: NORMAL
MCHC RBC-ENTMCNC: 28.1 PG
MCHC RBC-ENTMCNC: 32.9 GM/DL
MCV RBC AUTO: 85.3 FL
MONOCYTES # BLD AUTO: 0.63 K/UL
MONOCYTES NFR BLD AUTO: 10.3 %
NEUTROPHILS # BLD AUTO: 3.3 K/UL
NEUTROPHILS NFR BLD AUTO: 54.2 %
PLATELET # BLD AUTO: 205 K/UL
POTASSIUM SERPL-SCNC: 4.2 MMOL/L
PROT SERPL-MCNC: 7 G/DL
RBC # BLD: 5.24 M/UL
RBC # FLD: 12.9 %
RPR SER-TITR: NORMAL
SODIUM SERPL-SCNC: 137 MMOL/L
TRIGL SERPL-MCNC: 219 MG/DL
TSH SERPL-ACNC: 4.01 UIU/ML
WBC # FLD AUTO: 6.09 K/UL

## 2020-08-09 ENCOUNTER — TRANSCRIPTION ENCOUNTER (OUTPATIENT)
Age: 65
End: 2020-08-09

## 2021-01-07 ENCOUNTER — TRANSCRIPTION ENCOUNTER (OUTPATIENT)
Age: 66
End: 2021-01-07

## 2021-03-10 ENCOUNTER — RX RENEWAL (OUTPATIENT)
Age: 66
End: 2021-03-10

## 2021-03-16 ENCOUNTER — TRANSCRIPTION ENCOUNTER (OUTPATIENT)
Age: 66
End: 2021-03-16

## 2021-05-07 ENCOUNTER — APPOINTMENT (OUTPATIENT)
Dept: HEART AND VASCULAR | Facility: CLINIC | Age: 66
End: 2021-05-07
Payer: MEDICARE

## 2021-05-07 VITALS — BODY MASS INDEX: 29.27 KG/M2 | WEIGHT: 204 LBS

## 2021-05-07 VITALS — DIASTOLIC BLOOD PRESSURE: 78 MMHG | SYSTOLIC BLOOD PRESSURE: 142 MMHG

## 2021-05-07 DIAGNOSIS — R06.00 DYSPNEA, UNSPECIFIED: ICD-10-CM

## 2021-05-07 DIAGNOSIS — Z86.79 PERSONAL HISTORY OF OTHER DISEASES OF THE CIRCULATORY SYSTEM: ICD-10-CM

## 2021-05-07 PROCEDURE — ZZZZZ: CPT

## 2021-05-07 PROCEDURE — 93351 STRESS TTE COMPLETE: CPT

## 2021-05-07 PROCEDURE — 99214 OFFICE O/P EST MOD 30 MIN: CPT | Mod: 25

## 2021-05-07 RX ORDER — METOPROLOL SUCCINATE 100 MG/1
100 TABLET, EXTENDED RELEASE ORAL
Qty: 4 | Refills: 1 | Status: COMPLETED | COMMUNITY
Start: 2020-08-10 | End: 2021-05-07

## 2021-05-07 NOTE — PHYSICAL EXAM
[Well Developed] : well developed [Well Nourished] : well nourished [Normal Conjunctiva] : normal conjunctiva [Normal S1, S2] : normal S1, S2 [No Murmur] : no murmur [Good Air Entry] : good air entry [No Respiratory Distress] : no respiratory distress  [Gait - Sufficient for Exercise Testing] : gait - sufficient for exercise testing [Normal Gait] : normal gait [Moves all extremities] : moves all extremities [No Focal Deficits] : no focal deficits [Normal Speech] : normal speech [Alert and Oriented] : alert and oriented [Normal memory] : normal memory

## 2021-05-09 NOTE — HISTORY OF PRESENT ILLNESS
[FreeTextEntry1] : 65 year old male with PMHX of HTN and HLD and FMHX of CAD here for stress echocardiogram. \par \par Since last visit, patient is overall doing well. He continues to work out with a  over the computer about 3 times a week for about an hour without complications. He denies any chest pains, shortness of breath, palpitations, dizziness or syncope. He denies any claudications, leg swelling, PND or orthopnea. \par \par He has not gone for his CTA of coronaries.

## 2021-05-09 NOTE — REVIEW OF SYSTEMS
[SOB] : no shortness of breath [Dyspnea on exertion] : not dyspnea during exertion [Chest Discomfort] : no chest discomfort [Lower Ext Edema] : no extremity edema [Leg Claudication] : no intermittent leg claudication [Palpitations] : no palpitations [Orthopnea] : no orthopnea [PND] : no PND [Syncope] : no syncope [Dizziness] : no dizziness

## 2021-05-09 NOTE — DISCUSSION/SUMMARY
[FreeTextEntry1] : 65 year old male with PMHX of HTN and HLD and FMHX of CAD here for stress echocardiogram. \par \par Stress Echocardiogram: Normal study\par HTN: Controlled. Continue with Lisinopril 10mg\par HLD: Last LDL 80 (08/2020) Continue with Atorvastatin 40mg. \par \par Follow up in 6 months.  I have discussed the case with TAYE Mesa. I have personally performed a history, physical exam, and my own medical decision making. I have reviewed the note and agree with the findings and plan with the following additions: The patient has had dyspnea carrying items up stairs.  He is at risk for ischemia.  However the stress test was normal.

## 2021-05-10 PROBLEM — Z86.79 HISTORY OF ABNORMAL ELECTROCARDIOGRAPHY: Status: RESOLVED | Noted: 2019-03-08 | Resolved: 2021-05-10

## 2021-05-12 ENCOUNTER — RX RENEWAL (OUTPATIENT)
Age: 66
End: 2021-05-12

## 2021-06-11 ENCOUNTER — RX RENEWAL (OUTPATIENT)
Age: 66
End: 2021-06-11

## 2021-08-29 ENCOUNTER — RX RENEWAL (OUTPATIENT)
Age: 66
End: 2021-08-29

## 2021-09-09 ENCOUNTER — TRANSCRIPTION ENCOUNTER (OUTPATIENT)
Age: 66
End: 2021-09-09

## 2021-11-09 ENCOUNTER — APPOINTMENT (OUTPATIENT)
Dept: HEART AND VASCULAR | Facility: CLINIC | Age: 66
End: 2021-11-09

## 2021-11-18 ENCOUNTER — APPOINTMENT (OUTPATIENT)
Dept: HEART AND VASCULAR | Facility: CLINIC | Age: 66
End: 2021-11-18
Payer: MEDICARE

## 2021-11-18 VITALS — BODY MASS INDEX: 30.42 KG/M2 | WEIGHT: 212 LBS

## 2021-11-18 VITALS — SYSTOLIC BLOOD PRESSURE: 166 MMHG | DIASTOLIC BLOOD PRESSURE: 76 MMHG

## 2021-11-18 PROCEDURE — 36415 COLL VENOUS BLD VENIPUNCTURE: CPT

## 2021-11-18 PROCEDURE — 93000 ELECTROCARDIOGRAM COMPLETE: CPT

## 2021-11-18 PROCEDURE — 99214 OFFICE O/P EST MOD 30 MIN: CPT | Mod: 25

## 2021-11-19 LAB
ALBUMIN SERPL ELPH-MCNC: 4.8 G/DL
ALP BLD-CCNC: 62 U/L
ALT SERPL-CCNC: 26 U/L
ANION GAP SERPL CALC-SCNC: 13 MMOL/L
AST SERPL-CCNC: 21 U/L
BASOPHILS # BLD AUTO: 0.04 K/UL
BASOPHILS NFR BLD AUTO: 0.8 %
BILIRUB DIRECT SERPL-MCNC: 0.1 MG/DL
BILIRUB INDIRECT SERPL-MCNC: 0.2 MG/DL
BILIRUB SERPL-MCNC: 0.4 MG/DL
BUN SERPL-MCNC: 23 MG/DL
CALCIUM SERPL-MCNC: 9.9 MG/DL
CHLORIDE SERPL-SCNC: 102 MMOL/L
CHOLEST SERPL-MCNC: 169 MG/DL
CO2 SERPL-SCNC: 22 MMOL/L
CREAT SERPL-MCNC: 1.06 MG/DL
EOSINOPHIL # BLD AUTO: 0.25 K/UL
EOSINOPHIL NFR BLD AUTO: 4.8 %
ESTIMATED AVERAGE GLUCOSE: 131 MG/DL
GLUCOSE SERPL-MCNC: 143 MG/DL
HBA1C MFR BLD HPLC: 6.2 %
HCT VFR BLD CALC: 45 %
HDLC SERPL-MCNC: 48 MG/DL
HGB BLD-MCNC: 15 G/DL
IMM GRANULOCYTES NFR BLD AUTO: 0.4 %
LDLC SERPL CALC-MCNC: 75 MG/DL
LYMPHOCYTES # BLD AUTO: 1.93 K/UL
LYMPHOCYTES NFR BLD AUTO: 37.4 %
MAN DIFF?: NORMAL
MCHC RBC-ENTMCNC: 28.8 PG
MCHC RBC-ENTMCNC: 33.3 GM/DL
MCV RBC AUTO: 86.4 FL
MONOCYTES # BLD AUTO: 0.46 K/UL
MONOCYTES NFR BLD AUTO: 8.9 %
NEUTROPHILS # BLD AUTO: 2.46 K/UL
NEUTROPHILS NFR BLD AUTO: 47.7 %
NONHDLC SERPL-MCNC: 122 MG/DL
PLATELET # BLD AUTO: 215 K/UL
POTASSIUM SERPL-SCNC: 4.2 MMOL/L
PROT SERPL-MCNC: 7.2 G/DL
RBC # BLD: 5.21 M/UL
RBC # FLD: 13 %
SODIUM SERPL-SCNC: 137 MMOL/L
TRIGL SERPL-MCNC: 232 MG/DL
TSH SERPL-ACNC: 3 UIU/ML
WBC # FLD AUTO: 5.16 K/UL

## 2021-11-23 NOTE — HISTORY OF PRESENT ILLNESS
[FreeTextEntry1] : the patient works out with a  3 times a week.  His diet is improved.  He received the flu vaccine.

## 2021-11-23 NOTE — DISCUSSION/SUMMARY
[FreeTextEntry1] : The patient has hypertension hyperglycemia and hypercholesterolemia.  His blood pressure is elevated.  He will increase his lisinopril and take 20 mg daily.  He will attempt to improve his diet and exercise.He will continue his other medication.

## 2022-02-16 ENCOUNTER — RX RENEWAL (OUTPATIENT)
Age: 67
End: 2022-02-16

## 2022-05-13 ENCOUNTER — MED ADMIN CHARGE (OUTPATIENT)
Age: 67
End: 2022-05-13

## 2022-05-13 ENCOUNTER — APPOINTMENT (OUTPATIENT)
Dept: HEART AND VASCULAR | Facility: CLINIC | Age: 67
End: 2022-05-13
Payer: MEDICARE

## 2022-05-13 ENCOUNTER — LABORATORY RESULT (OUTPATIENT)
Age: 67
End: 2022-05-13

## 2022-05-13 VITALS
TEMPERATURE: 97 F | SYSTOLIC BLOOD PRESSURE: 152 MMHG | HEIGHT: 70 IN | HEART RATE: 61 BPM | WEIGHT: 212 LBS | OXYGEN SATURATION: 97 % | BODY MASS INDEX: 30.35 KG/M2 | DIASTOLIC BLOOD PRESSURE: 81 MMHG

## 2022-05-13 VITALS — SYSTOLIC BLOOD PRESSURE: 163 MMHG | DIASTOLIC BLOOD PRESSURE: 78 MMHG

## 2022-05-13 VITALS
TEMPERATURE: 97 F | HEART RATE: 70 BPM | SYSTOLIC BLOOD PRESSURE: 152 MMHG | HEIGHT: 70 IN | OXYGEN SATURATION: 96 % | DIASTOLIC BLOOD PRESSURE: 81 MMHG

## 2022-05-13 DIAGNOSIS — Z23 ENCOUNTER FOR IMMUNIZATION: ICD-10-CM

## 2022-05-13 PROCEDURE — 90471 IMMUNIZATION ADMIN: CPT

## 2022-05-13 PROCEDURE — 90715 TDAP VACCINE 7 YRS/> IM: CPT

## 2022-05-13 RX ORDER — ZOSTER VACCINE RECOMBINANT, ADJUVANTED 50 MCG/0.5
50 KIT INTRAMUSCULAR
Qty: 1 | Refills: 0 | Status: DISCONTINUED | OUTPATIENT
Start: 2017-12-15 | End: 2022-05-13

## 2022-05-13 NOTE — HISTORY OF PRESENT ILLNESS
[FreeTextEntry1] : The patient does Pilates and lifts weights.  He walks a mile and climbs 3 flights of stairs without difficulty.  He has chronic sciatica for which he is getting treatment.  He has been off his diet.  He had an eye exam in 2020.  He recently developed frequent bowel movements.  He saw Dr. Overton and will get a colonoscopy.  He has received 3 COVID vaccines and 2 shingles vaccines.  He received the Prevnar vaccine through 4 months ago but is unsure which 1.

## 2022-05-13 NOTE — PHYSICAL EXAM
[Normal Conjunctiva] : the conjunctiva exhibited no abnormalities [Eyelids - No Xanthelasma] : the eyelids demonstrated no xanthelasmas [Normal Oral Mucosa] : normal oral mucosa [No Oral Pallor] : no oral pallor [No Oral Cyanosis] : no oral cyanosis [Normal Jugular Venous A Waves Present] : normal jugular venous A waves present [Normal Jugular Venous V Waves Present] : normal jugular venous V waves present [No Jugular Venous Mooney A Waves] : no jugular venous mooney A waves [Respiration, Rhythm And Depth] : normal respiratory rhythm and effort [Exaggerated Use Of Accessory Muscles For Inspiration] : no accessory muscle use [Heart Rate And Rhythm] : heart rate and rhythm were normal [Auscultation Breath Sounds / Voice Sounds] : lungs were clear to auscultation bilaterally [Murmurs] : no murmurs present [Heart Sounds] : normal S1 and S2 [Arterial Pulses Normal] : the arterial pulses were normal [Abdomen Soft] : soft [Abdomen Tenderness] : non-tender [Abdomen Mass (___ Cm)] : no abdominal mass palpated [Gait - Sufficient For Exercise Testing] : the gait was sufficient for exercise testing [Abnormal Walk] : normal gait [Nail Clubbing] : no clubbing of the fingernails [Cyanosis, Localized] : no localized cyanosis [Skin Color & Pigmentation] : normal skin color and pigmentation [Petechial Hemorrhages (___cm)] : no petechial hemorrhages [No Venous Stasis] : no venous stasis [] : no rash [No Skin Ulcers] : no skin ulcer [No Xanthoma] : no  xanthoma was observed [Skin Lesions] : no skin lesions [Oriented To Time, Place, And Person] : oriented to person, place, and time [Affect] : the affect was normal [Mood] : the mood was normal [No Anxiety] : not feeling anxious [FreeTextEntry1] : Rectal testicular and hernia exams are normal.

## 2022-05-13 NOTE — DISCUSSION/SUMMARY
[FreeTextEntry1] : The patient has coronary calcification and a strongly positive family history for cardiovascular disease. He has hypertension and hypercholesterolemia and hyperglycemia. He is referred for a CAT scan angiogram of his coronaries. His blood pressure is acceptable. He has a history of left ventricular hypertrophy with hypertensive heart disease. This is stable. His ejection fraction is 65%. He will attempt to improve his diet. He will continue his current medication. The Prevnar and shingles vaccines were discussed and he will get them at the pharmacy.The patient will contact me what the names of his other providers. He is not having any cognitive impairment. He has no significant current or past depression. He is fully functional and there are no safety issues. He will be screened for eye examination, Colonoscopy  and Immunizations over the next 10 years. The patient was furnished with personalized health advice and does not need referral to health education or preventative counseling services. Patient does not need advanced care planning services.\par

## 2022-05-13 NOTE — PHYSICAL EXAM
[Well Developed] : well developed [Well Nourished] : well nourished [No Acute Distress] : no acute distress [Normal Conjunctiva] : normal conjunctiva [Normal Venous Pressure] : normal venous pressure [No Carotid Bruit] : no carotid bruit [Normal S1, S2] : normal S1, S2 [No Murmur] : no murmur [No Rub] : no rub [No Gallop] : no gallop [Clear Lung Fields] : clear lung fields [Good Air Entry] : good air entry [No Respiratory Distress] : no respiratory distress  [Soft] : abdomen soft [Non Tender] : non-tender [No Masses/organomegaly] : no masses/organomegaly [Normal Bowel Sounds] : normal bowel sounds [Normal Gait] : normal gait [No Edema] : no edema [No Cyanosis] : no cyanosis [No Clubbing] : no clubbing [No Varicosities] : no varicosities [Normal DP B/L] : normal DP B/L [No Rash] : no rash [No Skin Lesions] : no skin lesions [Moves all extremities] : moves all extremities [No Focal Deficits] : no focal deficits [Normal Speech] : normal speech [Alert and Oriented] : alert and oriented [Normal memory] : normal memory [de-identified] : Rectal enlarged smooth prostate, testicular and hernia exams are normal.  Guaiac negative

## 2022-05-13 NOTE — HISTORY OF PRESENT ILLNESS
[FreeTextEntry1] : The patient exercises 40 minutes three times a week. His diet is home-cooked but not low salt. He had an eye examination two weeks ago. He denies dyspnea or chest pain. He has not had a contrast allergy.

## 2022-05-13 NOTE — DISCUSSION/SUMMARY
[FreeTextEntry1] : The patient does moderate physical activity without difficulty.  His blood pressure is uncontrolled.  He will check home blood pressures.  He will go for an eye exam.  He will get a colonoscopy.  He will ascertain which Prevnar vaccine he received.  He will go for a follow-up calcium score.  Laboratory testing was done.  The patient will contact me what the names of his other providers. He is not having any cognitive impairment. He has no significant current or past depression. He is fully functional and there are no safety issues. He will be screened for eye examination, Colonoscopy  and Immunizations over the next 10 years. The patient was furnished with personalized health advice and does not need referral to health education or preventative counseling services. Patient does not need advanced care planning services.

## 2022-05-15 LAB
ALBUMIN SERPL ELPH-MCNC: 4.8 G/DL
ALP BLD-CCNC: 58 U/L
ALT SERPL-CCNC: 36 U/L
ANION GAP SERPL CALC-SCNC: 13 MMOL/L
AST SERPL-CCNC: 26 U/L
BASOPHILS # BLD AUTO: 0.03 K/UL
BASOPHILS NFR BLD AUTO: 0.6 %
BILIRUB DIRECT SERPL-MCNC: 0.1 MG/DL
BILIRUB INDIRECT SERPL-MCNC: 0.4 MG/DL
BILIRUB SERPL-MCNC: 0.6 MG/DL
BUN SERPL-MCNC: 22 MG/DL
CALCIUM SERPL-MCNC: 9.3 MG/DL
CHLORIDE SERPL-SCNC: 104 MMOL/L
CHOLEST SERPL-MCNC: 168 MG/DL
CO2 SERPL-SCNC: 23 MMOL/L
CREAT SERPL-MCNC: 1.03 MG/DL
EGFR: 80 ML/MIN/1.73M2
EOSINOPHIL # BLD AUTO: 0.27 K/UL
EOSINOPHIL NFR BLD AUTO: 5.7 %
ESTIMATED AVERAGE GLUCOSE: 137 MG/DL
GLUCOSE SERPL-MCNC: 122 MG/DL
HBA1C MFR BLD HPLC: 6.4 %
HCT VFR BLD CALC: 45.3 %
HDLC SERPL-MCNC: 53 MG/DL
HGB BLD-MCNC: 14.9 G/DL
IMM GRANULOCYTES NFR BLD AUTO: 0.4 %
LDLC SERPL CALC-MCNC: 95 MG/DL
LYMPHOCYTES # BLD AUTO: 1.83 K/UL
LYMPHOCYTES NFR BLD AUTO: 38.8 %
MAN DIFF?: NORMAL
MCHC RBC-ENTMCNC: 27.9 PG
MCHC RBC-ENTMCNC: 32.9 GM/DL
MCV RBC AUTO: 84.8 FL
MONOCYTES # BLD AUTO: 0.57 K/UL
MONOCYTES NFR BLD AUTO: 12.1 %
NEUTROPHILS # BLD AUTO: 2 K/UL
NEUTROPHILS NFR BLD AUTO: 42.4 %
NONHDLC SERPL-MCNC: 115 MG/DL
PLATELET # BLD AUTO: 195 K/UL
POTASSIUM SERPL-SCNC: 4.2 MMOL/L
PROT SERPL-MCNC: 7.1 G/DL
RBC # BLD: 5.34 M/UL
RBC # FLD: 13.1 %
SODIUM SERPL-SCNC: 139 MMOL/L
TRIGL SERPL-MCNC: 99 MG/DL
TSH SERPL-ACNC: 4.36 UIU/ML
WBC # FLD AUTO: 4.72 K/UL

## 2022-05-18 ENCOUNTER — TRANSCRIPTION ENCOUNTER (OUTPATIENT)
Age: 67
End: 2022-05-18

## 2022-05-18 RX ORDER — PNEUMOCOCCAL 20-VALENT CONJUGATE VACCINE 2.2; 2.2; 2.2; 2.2; 2.2; 2.2; 2.2; 2.2; 2.2; 2.2; 2.2; 2.2; 2.2; 2.2; 2.2; 2.2; 4.4; 2.2; 2.2; 2.2 UG/.5ML; UG/.5ML; UG/.5ML; UG/.5ML; UG/.5ML; UG/.5ML; UG/.5ML; UG/.5ML; UG/.5ML; UG/.5ML; UG/.5ML; UG/.5ML; UG/.5ML; UG/.5ML; UG/.5ML; UG/.5ML; UG/.5ML; UG/.5ML; UG/.5ML; UG/.5ML
0.5 INJECTION, SUSPENSION INTRAMUSCULAR
Qty: 1 | Refills: 0 | Status: ACTIVE | COMMUNITY
Start: 2022-05-18 | End: 1900-01-01

## 2022-06-01 ENCOUNTER — RX RENEWAL (OUTPATIENT)
Age: 67
End: 2022-06-01

## 2022-07-05 ENCOUNTER — RX RENEWAL (OUTPATIENT)
Age: 67
End: 2022-07-05

## 2022-07-17 ENCOUNTER — RX RENEWAL (OUTPATIENT)
Age: 67
End: 2022-07-17

## 2022-07-19 ENCOUNTER — APPOINTMENT (OUTPATIENT)
Dept: HEART AND VASCULAR | Facility: CLINIC | Age: 67
End: 2022-07-19

## 2022-07-19 DIAGNOSIS — U07.1 COVID-19: ICD-10-CM

## 2022-07-19 PROCEDURE — 99214 OFFICE O/P EST MOD 30 MIN: CPT | Mod: CS,95

## 2022-07-19 NOTE — DISCUSSION/SUMMARY
[FreeTextEntry1] : The patient has mild case of COVID.  He has no shortness of breath.  He is at risk for serious COVID because of his age and is borderline by his BMI.  His hypertension is also a partial risk factor.  He will stop his atorvastatin and take Paxlovid.  We will take Robitussin-DM.  He will isolate.

## 2022-07-19 NOTE — REVIEW OF SYSTEMS
[Fever] : fever [Feeling Fatigued] : feeling fatigued [Cough] : cough [Joint Pain] : joint pain [Negative] : Heme/Lymph

## 2022-07-19 NOTE — HISTORY OF PRESENT ILLNESS
[FreeTextEntry1] : Patient had 1 COVID booster in August.  He has had a cough felt feverish and had fatigue since Sunday.  Today he did a rapid COVID test at home which was positive.  He denies dyspnea.

## 2022-07-20 ENCOUNTER — TRANSCRIPTION ENCOUNTER (OUTPATIENT)
Age: 67
End: 2022-07-20

## 2022-07-25 ENCOUNTER — TRANSCRIPTION ENCOUNTER (OUTPATIENT)
Age: 67
End: 2022-07-25

## 2022-07-26 ENCOUNTER — TRANSCRIPTION ENCOUNTER (OUTPATIENT)
Age: 67
End: 2022-07-26

## 2022-08-12 ENCOUNTER — APPOINTMENT (OUTPATIENT)
Dept: HEART AND VASCULAR | Facility: CLINIC | Age: 67
End: 2022-08-12

## 2022-08-12 ENCOUNTER — NON-APPOINTMENT (OUTPATIENT)
Age: 67
End: 2022-08-12

## 2022-08-12 VITALS — SYSTOLIC BLOOD PRESSURE: 132 MMHG | DIASTOLIC BLOOD PRESSURE: 80 MMHG

## 2022-08-12 VITALS
WEIGHT: 204 LBS | TEMPERATURE: 97.2 F | DIASTOLIC BLOOD PRESSURE: 78 MMHG | SYSTOLIC BLOOD PRESSURE: 161 MMHG | HEIGHT: 70 IN | BODY MASS INDEX: 29.2 KG/M2 | HEART RATE: 64 BPM

## 2022-08-12 DIAGNOSIS — Z86.79 PERSONAL HISTORY OF OTHER DISEASES OF THE CIRCULATORY SYSTEM: ICD-10-CM

## 2022-08-12 PROCEDURE — 93000 ELECTROCARDIOGRAM COMPLETE: CPT

## 2022-08-12 PROCEDURE — 93306 TTE W/DOPPLER COMPLETE: CPT

## 2022-08-12 PROCEDURE — 99214 OFFICE O/P EST MOD 30 MIN: CPT | Mod: 25

## 2022-08-12 RX ORDER — DEXTROMETHORPHAN POLISTIREX 30 MG/5ML
30 SUSPENSION, EXTENDED RELEASE ORAL
Qty: 1 | Refills: 1 | Status: DISCONTINUED | COMMUNITY
Start: 2022-07-19 | End: 2022-08-12

## 2022-08-12 RX ORDER — NIRMATRELVIR AND RITONAVIR 300-100 MG
20 X 150 MG & KIT ORAL
Qty: 5 | Refills: 0 | Status: DISCONTINUED | COMMUNITY
Start: 2022-07-19 | End: 2022-08-12

## 2022-08-12 RX ORDER — LISINOPRIL 10 MG/1
10 TABLET ORAL
Qty: 90 | Refills: 0 | Status: DISCONTINUED | COMMUNITY
Start: 2021-06-11

## 2022-08-12 NOTE — DISCUSSION/SUMMARY
[FreeTextEntry1] : The patient is recovering from COVID.  He has hypertensive cardiomyopathy.  Left true hypertrophy is stable.  He is asymptomatic.  He will increase his exercise once he is fully recovered from COVID.  His blood pressure is controlled.  We will continue lisinopril.

## 2022-08-12 NOTE — HISTORY OF PRESENT ILLNESS
[FreeTextEntry1] : The patient recently had COVID.  His last COVID test was negative.  He has a persistent mild cough and fatigue.  He is back to exercising 3 times a week.  His diet is okay.  While having COVID his alcohol intake dropped from 2 a day and he is taking 1 a day.  He has reduced his sertraline and is taking 100 alternating with 200.

## 2022-10-11 ENCOUNTER — RX RENEWAL (OUTPATIENT)
Age: 67
End: 2022-10-11

## 2022-11-20 ENCOUNTER — RX RENEWAL (OUTPATIENT)
Age: 67
End: 2022-11-20

## 2023-01-07 ENCOUNTER — RX RENEWAL (OUTPATIENT)
Age: 68
End: 2023-01-07

## 2023-03-24 ENCOUNTER — APPOINTMENT (OUTPATIENT)
Dept: HEART AND VASCULAR | Facility: CLINIC | Age: 68
End: 2023-03-24
Payer: MEDICARE

## 2023-03-24 ENCOUNTER — NON-APPOINTMENT (OUTPATIENT)
Age: 68
End: 2023-03-24

## 2023-03-24 VITALS
HEART RATE: 56 BPM | DIASTOLIC BLOOD PRESSURE: 80 MMHG | HEIGHT: 70 IN | SYSTOLIC BLOOD PRESSURE: 154 MMHG | BODY MASS INDEX: 28.63 KG/M2 | WEIGHT: 200 LBS

## 2023-03-24 VITALS — DIASTOLIC BLOOD PRESSURE: 92 MMHG | SYSTOLIC BLOOD PRESSURE: 155 MMHG

## 2023-03-24 DIAGNOSIS — Z86.39 PERSONAL HISTORY OF OTHER ENDOCRINE, NUTRITIONAL AND METABOLIC DISEASE: ICD-10-CM

## 2023-03-24 DIAGNOSIS — I51.7 CARDIOMEGALY: ICD-10-CM

## 2023-03-24 PROCEDURE — 99214 OFFICE O/P EST MOD 30 MIN: CPT | Mod: 25

## 2023-03-24 PROCEDURE — 93000 ELECTROCARDIOGRAM COMPLETE: CPT

## 2023-03-24 RX ORDER — PNEUMOCOCCAL 13-VALENT CONJUGATE VACCINE 2.2; 2.2; 2.2; 2.2; 2.2; 4.4; 2.2; 2.2; 2.2; 2.2; 2.2; 2.2; 2.2 UG/.5ML; UG/.5ML; UG/.5ML; UG/.5ML; UG/.5ML; UG/.5ML; UG/.5ML; UG/.5ML; UG/.5ML; UG/.5ML; UG/.5ML; UG/.5ML; UG/.5ML
INJECTION, SUSPENSION INTRAMUSCULAR
Qty: 1 | Refills: 0 | Status: DISCONTINUED | COMMUNITY
Start: 2020-08-05 | End: 2023-03-24

## 2023-03-27 NOTE — HISTORY OF PRESENT ILLNESS
[FreeTextEntry1] : His father recently passed away and his mood is down.  He has had difficulty sleeping.  He meets with a  3 times a week and walks daily.  He has 10 alcoholic drinks a week.  He reduced his sertraline and is taking 200 mg alternating with 150 because of concerns about being on too high a dose.  He takes diazepam 5 mg 2 tablets 2-3 times a week.

## 2023-03-27 NOTE — DISCUSSION/SUMMARY
[FreeTextEntry1] : The patient's mood is down after the passing of his father.  He will continue to be active.  He will take sertraline 200 alternating with 150 mg daily.  He will attempt to reduce his intake of alcohol.  His EKG is normal.  His blood pressure is elevated, which is new.  He will check it at home and report back to us. [EKG obtained to assist in diagnosis and management of assessed problem(s)] : EKG obtained to assist in diagnosis and management of assessed problem(s)

## 2023-04-04 ENCOUNTER — TRANSCRIPTION ENCOUNTER (OUTPATIENT)
Age: 68
End: 2023-04-04

## 2023-06-19 ENCOUNTER — RX RENEWAL (OUTPATIENT)
Age: 68
End: 2023-06-19

## 2023-06-26 ENCOUNTER — RX RENEWAL (OUTPATIENT)
Age: 68
End: 2023-06-26

## 2023-06-30 ENCOUNTER — APPOINTMENT (OUTPATIENT)
Dept: HEART AND VASCULAR | Facility: CLINIC | Age: 68
End: 2023-06-30
Payer: MEDICARE

## 2023-06-30 VITALS — SYSTOLIC BLOOD PRESSURE: 156 MMHG | DIASTOLIC BLOOD PRESSURE: 93 MMHG

## 2023-06-30 VITALS
HEART RATE: 76 BPM | WEIGHT: 202 LBS | TEMPERATURE: 97 F | DIASTOLIC BLOOD PRESSURE: 71 MMHG | BODY MASS INDEX: 28.92 KG/M2 | SYSTOLIC BLOOD PRESSURE: 142 MMHG | OXYGEN SATURATION: 98 % | HEIGHT: 70 IN

## 2023-06-30 DIAGNOSIS — Z98.890 OTHER SPECIFIED POSTPROCEDURAL STATES: ICD-10-CM

## 2023-06-30 PROCEDURE — 93000 ELECTROCARDIOGRAM COMPLETE: CPT

## 2023-06-30 PROCEDURE — G0439: CPT

## 2023-06-30 PROCEDURE — 36415 COLL VENOUS BLD VENIPUNCTURE: CPT

## 2023-07-02 PROBLEM — Z98.890 HISTORY OF COLONOSCOPY: Status: ACTIVE | Noted: 2023-07-02

## 2023-07-02 LAB
ALBUMIN SERPL ELPH-MCNC: 4.7 G/DL
ALP BLD-CCNC: 60 U/L
ALT SERPL-CCNC: 25 U/L
ANION GAP SERPL CALC-SCNC: 12 MMOL/L
AST SERPL-CCNC: 21 U/L
BILIRUB DIRECT SERPL-MCNC: 0.2 MG/DL
BILIRUB INDIRECT SERPL-MCNC: 0.5 MG/DL
BILIRUB SERPL-MCNC: 0.6 MG/DL
BUN SERPL-MCNC: 19 MG/DL
CALCIUM SERPL-MCNC: 9.7 MG/DL
CHLORIDE SERPL-SCNC: 103 MMOL/L
CHOLEST SERPL-MCNC: 169 MG/DL
CO2 SERPL-SCNC: 25 MMOL/L
CREAT SERPL-MCNC: 1.12 MG/DL
EGFR: 72 ML/MIN/1.73M2
ESTIMATED AVERAGE GLUCOSE: 137 MG/DL
GLUCOSE SERPL-MCNC: 112 MG/DL
HBA1C MFR BLD HPLC: 6.4 %
HDLC SERPL-MCNC: 52 MG/DL
LDLC SERPL CALC-MCNC: 82 MG/DL
NONHDLC SERPL-MCNC: 116 MG/DL
POTASSIUM SERPL-SCNC: 4.1 MMOL/L
PROT SERPL-MCNC: 7.1 G/DL
SODIUM SERPL-SCNC: 139 MMOL/L
TRIGL SERPL-MCNC: 172 MG/DL
TSH SERPL-ACNC: 3.43 UIU/ML

## 2023-07-02 NOTE — HISTORY OF PRESENT ILLNESS
[FreeTextEntry1] : The patient walks 1/4 miles without difficulty.  He also works out with a .  His diet is fair.  He has red meat twice a week.  He has 14 alcoholic drinks a week.  His last eye exam was 2-3 years ago.  He has had 4 COVID vaccines.  He climbs 2 flights of stairs without difficulty.

## 2023-07-02 NOTE — DISCUSSION/SUMMARY
[FreeTextEntry1] : The patient will attempt to improve his diet and reduce or eliminate alcohol.  He will go for an eye exam.  His blood pressure is uncontrolled.  He will increase his lisinopril and take 30 mg daily.  The patient will contact me what the names of his other providers. He is not having any cognitive impairment. He has no significant current or past depression. He is fully functional and there are no safety issues. He will be screened for eye examination, Colonoscopy  and Immunizations over the next 10 years. The patient was furnished with personalized health advice and does not need referral to health education or preventative counseling services. Patient does not need advanced care planning services.  Laboratory testing was done. [EKG obtained to assist in diagnosis and management of assessed problem(s)] : EKG obtained to assist in diagnosis and management of assessed problem(s)

## 2023-07-21 ENCOUNTER — APPOINTMENT (OUTPATIENT)
Dept: HEART AND VASCULAR | Facility: CLINIC | Age: 68
End: 2023-07-21

## 2023-08-25 ENCOUNTER — TRANSCRIPTION ENCOUNTER (OUTPATIENT)
Age: 68
End: 2023-08-25

## 2023-08-27 ENCOUNTER — RX RENEWAL (OUTPATIENT)
Age: 68
End: 2023-08-27

## 2023-09-13 ENCOUNTER — APPOINTMENT (OUTPATIENT)
Dept: HEART AND VASCULAR | Facility: CLINIC | Age: 68
End: 2023-09-13
Payer: MEDICARE

## 2023-09-13 VITALS — SYSTOLIC BLOOD PRESSURE: 167 MMHG | HEART RATE: 73 BPM | OXYGEN SATURATION: 99 % | DIASTOLIC BLOOD PRESSURE: 92 MMHG

## 2023-09-13 VITALS — DIASTOLIC BLOOD PRESSURE: 84 MMHG | SYSTOLIC BLOOD PRESSURE: 146 MMHG

## 2023-09-13 VITALS — HEIGHT: 70 IN | WEIGHT: 206 LBS | BODY MASS INDEX: 29.49 KG/M2

## 2023-09-13 DIAGNOSIS — J06.9 ACUTE UPPER RESPIRATORY INFECTION, UNSPECIFIED: ICD-10-CM

## 2023-09-13 DIAGNOSIS — T78.40XA ALLERGY, UNSPECIFIED, INITIAL ENCOUNTER: ICD-10-CM

## 2023-09-13 DIAGNOSIS — R05.9 COUGH, UNSPECIFIED: ICD-10-CM

## 2023-09-13 PROCEDURE — 93000 ELECTROCARDIOGRAM COMPLETE: CPT

## 2023-09-13 PROCEDURE — 99214 OFFICE O/P EST MOD 30 MIN: CPT | Mod: 25

## 2023-09-14 PROBLEM — J06.9 ACUTE URI: Status: ACTIVE | Noted: 2017-02-03

## 2023-09-14 PROBLEM — R05.9 COUGH: Status: ACTIVE | Noted: 2023-09-14

## 2023-09-14 LAB
RAPID RVP RESULT: DETECTED
RV+EV RNA SPEC QL NAA+PROBE: DETECTED
SARS-COV-2 RNA PNL RESP NAA+PROBE: NOT DETECTED

## 2023-09-19 ENCOUNTER — APPOINTMENT (OUTPATIENT)
Dept: RADIOLOGY | Facility: CLINIC | Age: 68
End: 2023-09-19
Payer: MEDICARE

## 2023-09-19 PROCEDURE — 71046 X-RAY EXAM CHEST 2 VIEWS: CPT

## 2023-09-20 ENCOUNTER — TRANSCRIPTION ENCOUNTER (OUTPATIENT)
Age: 68
End: 2023-09-20

## 2023-09-21 ENCOUNTER — TRANSCRIPTION ENCOUNTER (OUTPATIENT)
Age: 68
End: 2023-09-21

## 2023-09-26 ENCOUNTER — TRANSCRIPTION ENCOUNTER (OUTPATIENT)
Age: 68
End: 2023-09-26

## 2023-09-29 ENCOUNTER — TRANSCRIPTION ENCOUNTER (OUTPATIENT)
Age: 68
End: 2023-09-29

## 2023-10-02 ENCOUNTER — TRANSCRIPTION ENCOUNTER (OUTPATIENT)
Age: 68
End: 2023-10-02

## 2023-10-19 ENCOUNTER — TRANSCRIPTION ENCOUNTER (OUTPATIENT)
Age: 68
End: 2023-10-19

## 2023-12-13 DIAGNOSIS — Z01.89 ENCOUNTER FOR OTHER SPECIFIED SPECIAL EXAMINATIONS: ICD-10-CM

## 2024-02-22 ENCOUNTER — TRANSCRIPTION ENCOUNTER (OUTPATIENT)
Age: 69
End: 2024-02-22

## 2024-03-01 ENCOUNTER — APPOINTMENT (OUTPATIENT)
Dept: HEART AND VASCULAR | Facility: CLINIC | Age: 69
End: 2024-03-01
Payer: MEDICARE

## 2024-03-01 VITALS
WEIGHT: 206 LBS | BODY MASS INDEX: 29.49 KG/M2 | HEIGHT: 70 IN | TEMPERATURE: 97.2 F | DIASTOLIC BLOOD PRESSURE: 89 MMHG | SYSTOLIC BLOOD PRESSURE: 157 MMHG | HEART RATE: 60 BPM | OXYGEN SATURATION: 98 %

## 2024-03-01 DIAGNOSIS — E78.00 PURE HYPERCHOLESTEROLEMIA, UNSPECIFIED: ICD-10-CM

## 2024-03-01 PROCEDURE — 93306 TTE W/DOPPLER COMPLETE: CPT

## 2024-03-01 PROCEDURE — 99213 OFFICE O/P EST LOW 20 MIN: CPT | Mod: 25

## 2024-03-01 PROCEDURE — 36415 COLL VENOUS BLD VENIPUNCTURE: CPT

## 2024-03-01 PROCEDURE — 93000 ELECTROCARDIOGRAM COMPLETE: CPT

## 2024-03-02 LAB
ANION GAP SERPL CALC-SCNC: 10 MMOL/L
BUN SERPL-MCNC: 14 MG/DL
CALCIUM SERPL-MCNC: 9.7 MG/DL
CHLORIDE SERPL-SCNC: 102 MMOL/L
CHOLEST SERPL-MCNC: 164 MG/DL
CO2 SERPL-SCNC: 26 MMOL/L
CREAT SERPL-MCNC: 1.16 MG/DL
EGFR: 69 ML/MIN/1.73M2
ESTIMATED AVERAGE GLUCOSE: 131 MG/DL
GLUCOSE SERPL-MCNC: 108 MG/DL
HBA1C MFR BLD HPLC: 6.2 %
HDLC SERPL-MCNC: 49 MG/DL
LDLC SERPL CALC-MCNC: 86 MG/DL
NONHDLC SERPL-MCNC: 115 MG/DL
POTASSIUM SERPL-SCNC: 4.2 MMOL/L
SODIUM SERPL-SCNC: 138 MMOL/L
TRIGL SERPL-MCNC: 167 MG/DL

## 2024-03-04 NOTE — HISTORY OF PRESENT ILLNESS
[FreeTextEntry1] : The patient feels that he developed ED with the increase in lisinopril.  He walks 3 times a week does Pilates and lifts weights.  He walks 2.5 miles once a week.  He has red meat once a week and the majority of his food is home-cooked.

## 2024-03-04 NOTE — PHYSICAL EXAM
[Well Nourished] : well nourished [Well Developed] : well developed [No Acute Distress] : no acute distress [Normal Conjunctiva] : normal conjunctiva [No Carotid Bruit] : no carotid bruit [Normal Venous Pressure] : normal venous pressure [Normal S1, S2] : normal S1, S2 [No Murmur] : no murmur [No Rub] : no rub [No Gallop] : no gallop [Good Air Entry] : good air entry [Clear Lung Fields] : clear lung fields [No Respiratory Distress] : no respiratory distress  [Soft] : abdomen soft [Non Tender] : non-tender [No Masses/organomegaly] : no masses/organomegaly [Normal Bowel Sounds] : normal bowel sounds [Normal Gait] : normal gait [No Clubbing] : no clubbing [No Cyanosis] : no cyanosis [No Varicosities] : no varicosities [Normal DP B/L] : normal DP B/L [Edema ___] : edema [unfilled] [No Rash] : no rash [No Skin Lesions] : no skin lesions [Moves all extremities] : moves all extremities [No Focal Deficits] : no focal deficits [Normal Speech] : normal speech [Normal memory] : normal memory [Alert and Oriented] : alert and oriented

## 2024-03-04 NOTE — DISCUSSION/SUMMARY
[FreeTextEntry1] : The patient does moderate physical activity without difficulty.  He will attempt to improve his diet.  His EKG is normal.  He feels that the increased dose of lisinopril caused ED.  He will reduce his lisinopril and take 20 mg daily.  He did not take any today.  His A1c is elevated.  He will attempt to improve his diet.  His LDL is good.  He will continue atorvastatin.  The echocardiogram shows a normal ejection fraction with mild left ventricular hypertrophy.  We will continue tight blood pressure control. [EKG obtained to assist in diagnosis and management of assessed problem(s)] : EKG obtained to assist in diagnosis and management of assessed problem(s)

## 2024-03-27 ENCOUNTER — TRANSCRIPTION ENCOUNTER (OUTPATIENT)
Age: 69
End: 2024-03-27

## 2024-03-28 ENCOUNTER — TRANSCRIPTION ENCOUNTER (OUTPATIENT)
Age: 69
End: 2024-03-28

## 2024-03-29 ENCOUNTER — TRANSCRIPTION ENCOUNTER (OUTPATIENT)
Age: 69
End: 2024-03-29

## 2024-04-05 ENCOUNTER — APPOINTMENT (OUTPATIENT)
Dept: OTOLARYNGOLOGY | Facility: CLINIC | Age: 69
End: 2024-04-05
Payer: MEDICARE

## 2024-04-05 VITALS
HEIGHT: 70 IN | HEART RATE: 74 BPM | SYSTOLIC BLOOD PRESSURE: 132 MMHG | DIASTOLIC BLOOD PRESSURE: 82 MMHG | WEIGHT: 203 LBS | TEMPERATURE: 98.1 F | BODY MASS INDEX: 29.06 KG/M2 | OXYGEN SATURATION: 98 %

## 2024-04-05 DIAGNOSIS — Z86.79 PERSONAL HISTORY OF OTHER DISEASES OF THE CIRCULATORY SYSTEM: ICD-10-CM

## 2024-04-05 DIAGNOSIS — Z78.9 OTHER SPECIFIED HEALTH STATUS: ICD-10-CM

## 2024-04-05 DIAGNOSIS — Z82.2 FAMILY HISTORY OF DEAFNESS AND HEARING LOSS: ICD-10-CM

## 2024-04-05 DIAGNOSIS — H92.02 OTALGIA, LEFT EAR: ICD-10-CM

## 2024-04-05 DIAGNOSIS — R68.89 OTHER GENERAL SYMPTOMS AND SIGNS: ICD-10-CM

## 2024-04-05 DIAGNOSIS — Z82.49 FAMILY HISTORY OF ISCHEMIC HEART DISEASE AND OTHER DISEASES OF THE CIRCULATORY SYSTEM: ICD-10-CM

## 2024-04-05 PROCEDURE — 99203 OFFICE O/P NEW LOW 30 MIN: CPT

## 2024-04-06 PROBLEM — R68.89 JAW SYMPTOM: Status: ACTIVE | Noted: 2024-04-06

## 2024-04-06 PROBLEM — H92.02 OTALGIA OF LEFT EAR: Status: ACTIVE | Noted: 2024-04-06

## 2024-04-06 NOTE — HISTORY OF PRESENT ILLNESS
[de-identified] : 69 yo m with sensation of congestion/fullness in l ear and is concerned he has cerumen impaction in his ear - became aware of this 3-4 mo ago. He uses qtips- recommended not to do this . He explains he had cerumen removed 30 yrs ago and wished to see if this was the cause. His brother had his adenoids removed 50 yrs ago. +bruxism nonsmoker.

## 2024-04-06 NOTE — ASSESSMENT
[FreeTextEntry1] : l otalgia/fullness reassured no cerumen rec  - he could not wait- rescheduled explained he does have tmj and what this is, that this could be cause of his ear sensation - rec soft diet avoid bruxism nsaids if tolerated - see denitst consider mouth guard

## 2024-04-19 ENCOUNTER — APPOINTMENT (OUTPATIENT)
Dept: OTOLARYNGOLOGY | Facility: CLINIC | Age: 69
End: 2024-04-19
Payer: MEDICARE

## 2024-04-19 VITALS
HEART RATE: 68 BPM | HEIGHT: 70 IN | SYSTOLIC BLOOD PRESSURE: 167 MMHG | BODY MASS INDEX: 29.06 KG/M2 | OXYGEN SATURATION: 97 % | TEMPERATURE: 97.9 F | DIASTOLIC BLOOD PRESSURE: 92 MMHG | WEIGHT: 203 LBS

## 2024-04-19 DIAGNOSIS — H69.90 UNSPECIFIED EUSTACHIAN TUBE DISORDER, UNSPECIFIED EAR: ICD-10-CM

## 2024-04-19 DIAGNOSIS — R68.89 OTHER GENERAL SYMPTOMS AND SIGNS: ICD-10-CM

## 2024-04-19 DIAGNOSIS — H90.A21 SENSORINEURAL HEARING LOSS, UNILATERAL, RIGHT EAR, WITH RESTRICTED HEARING ON THE CONTRALATERAL SIDE: ICD-10-CM

## 2024-04-19 PROCEDURE — 31231 NASAL ENDOSCOPY DX: CPT

## 2024-04-19 PROCEDURE — 99214 OFFICE O/P EST MOD 30 MIN: CPT | Mod: 25

## 2024-04-19 PROCEDURE — 92567 TYMPANOMETRY: CPT

## 2024-04-19 PROCEDURE — 92557 COMPREHENSIVE HEARING TEST: CPT

## 2024-04-19 RX ORDER — AZITHROMYCIN 250 MG/1
250 TABLET, FILM COATED ORAL
Qty: 1 | Refills: 0 | Status: ACTIVE | COMMUNITY
Start: 2024-04-19 | End: 1900-01-01

## 2024-05-03 ENCOUNTER — APPOINTMENT (OUTPATIENT)
Dept: MRI IMAGING | Facility: HOSPITAL | Age: 69
End: 2024-05-03

## 2024-05-05 ENCOUNTER — RX RENEWAL (OUTPATIENT)
Age: 69
End: 2024-05-05

## 2024-05-06 ENCOUNTER — RX RENEWAL (OUTPATIENT)
Age: 69
End: 2024-05-06

## 2024-06-06 ENCOUNTER — TRANSCRIPTION ENCOUNTER (OUTPATIENT)
Age: 69
End: 2024-06-06

## 2024-06-07 ENCOUNTER — APPOINTMENT (OUTPATIENT)
Dept: HEART AND VASCULAR | Facility: CLINIC | Age: 69
End: 2024-06-07
Payer: MEDICARE

## 2024-06-07 VITALS
HEIGHT: 70 IN | SYSTOLIC BLOOD PRESSURE: 118 MMHG | DIASTOLIC BLOOD PRESSURE: 78 MMHG | BODY MASS INDEX: 29.06 KG/M2 | WEIGHT: 203 LBS

## 2024-06-07 VITALS — HEART RATE: 90 BPM

## 2024-06-07 DIAGNOSIS — I11.9 HYPERTENSIVE HEART DISEASE W/OUT HEART FAILURE: ICD-10-CM

## 2024-06-07 DIAGNOSIS — R93.1 ABNORMAL FINDINGS ON DIAGNOSTIC IMAGING OF HEART AND CORONARY CIRCULATION: ICD-10-CM

## 2024-06-07 DIAGNOSIS — Z00.00 ENCOUNTER FOR GENERAL ADULT MEDICAL EXAMINATION W/OUT ABNORMAL FINDINGS: ICD-10-CM

## 2024-06-07 PROCEDURE — ZZZZZ: CPT | Mod: NC

## 2024-06-07 PROCEDURE — G0439: CPT

## 2024-06-07 PROCEDURE — 93351 STRESS TTE COMPLETE: CPT

## 2024-06-09 PROBLEM — R93.1 AGATSTON CORONARY ARTERY CALCIUM SCORE BETWEEN 100 AND 199: Status: ACTIVE | Noted: 2017-06-09

## 2024-06-09 PROBLEM — I11.9 HYPERTENSIVE HEART DISEASE WITHOUT CHF: Status: ACTIVE | Noted: 2022-08-12

## 2024-06-09 NOTE — PHYSICAL EXAM
[Well Developed] : well developed [Well Nourished] : well nourished [No Acute Distress] : no acute distress [Normal Conjunctiva] : normal conjunctiva [Normal Venous Pressure] : normal venous pressure [No Carotid Bruit] : no carotid bruit [Normal S1, S2] : normal S1, S2 [No Murmur] : no murmur [No Rub] : no rub [No Gallop] : no gallop [Good Air Entry] : good air entry [Clear Lung Fields] : clear lung fields [No Respiratory Distress] : no respiratory distress  [Soft] : abdomen soft [Non Tender] : non-tender [No Masses/organomegaly] : no masses/organomegaly [Normal Bowel Sounds] : normal bowel sounds [Normal Gait] : normal gait [No Cyanosis] : no cyanosis [No Clubbing] : no clubbing [No Varicosities] : no varicosities [Normal DP B/L] : normal DP B/L [Edema ___] : edema [unfilled] [No Rash] : no rash [No Skin Lesions] : no skin lesions [Moves all extremities] : moves all extremities [No Focal Deficits] : no focal deficits [Normal Speech] : normal speech [Alert and Oriented] : alert and oriented [Normal memory] : normal memory [de-identified] : Rectal testicular and hernia exams are normal.

## 2024-06-09 NOTE — HISTORY OF PRESENT ILLNESS
[FreeTextEntry1] : The patient has red meat once or twice a week.  His diet is mostly home-cooked.  He has 2 alcoholic drinks a day.  He had an eye exam a year ago.  He had the COVID-vaccine in 2022.  He has had a hearing test.

## 2024-06-09 NOTE — DISCUSSION/SUMMARY
[FreeTextEntry1] :  The patient has hypertension and hypercholesterolemia.  Diet and exercise were discussed.  Reduced or stopping alcohol was recommended.  He is up-to-date on his health maintenance and vaccinations.  The patient will contact me what the names of his other providers. He is not having any cognitive impairment. He has no significant current or past depression. He is fully functional and there are no safety issues. He will be screened for eye examination, Colonoscopy and Immunizations over the next 10 years. The patient was furnished with personalized health advice and does not need referral to health education or preventative counseling services. Patient does not need advanced care planning services.  Laboratory testing was done.  The patient's calcium score is elevated.  He is at increased risk for coronary artery disease.  The stress echocardiogram was normal.  Significant coronary obstruction is unlikely.  We will continue to treat his risk factors. [EKG obtained to assist in diagnosis and management of assessed problem(s)] : EKG obtained to assist in diagnosis and management of assessed problem(s)

## 2024-09-19 ENCOUNTER — RX RENEWAL (OUTPATIENT)
Age: 69
End: 2024-09-19

## 2024-12-06 ENCOUNTER — APPOINTMENT (OUTPATIENT)
Dept: HEART AND VASCULAR | Facility: CLINIC | Age: 69
End: 2024-12-06
Payer: MEDICARE

## 2024-12-06 VITALS
BODY MASS INDEX: 29.35 KG/M2 | HEART RATE: 67 BPM | WEIGHT: 205 LBS | DIASTOLIC BLOOD PRESSURE: 87 MMHG | OXYGEN SATURATION: 95 % | TEMPERATURE: 96.8 F | SYSTOLIC BLOOD PRESSURE: 139 MMHG | HEIGHT: 70 IN

## 2024-12-06 VITALS — SYSTOLIC BLOOD PRESSURE: 163 MMHG | DIASTOLIC BLOOD PRESSURE: 88 MMHG

## 2024-12-06 DIAGNOSIS — Z01.89 ENCOUNTER FOR OTHER SPECIFIED SPECIAL EXAMINATIONS: ICD-10-CM

## 2024-12-06 DIAGNOSIS — E78.00 PURE HYPERCHOLESTEROLEMIA, UNSPECIFIED: ICD-10-CM

## 2024-12-06 DIAGNOSIS — R93.1 ABNORMAL FINDINGS ON DIAGNOSTIC IMAGING OF HEART AND CORONARY CIRCULATION: ICD-10-CM

## 2024-12-06 DIAGNOSIS — I11.9 HYPERTENSIVE HEART DISEASE W/OUT HEART FAILURE: ICD-10-CM

## 2024-12-06 PROCEDURE — 36415 COLL VENOUS BLD VENIPUNCTURE: CPT

## 2024-12-06 PROCEDURE — 99214 OFFICE O/P EST MOD 30 MIN: CPT | Mod: 25

## 2024-12-06 PROCEDURE — 93000 ELECTROCARDIOGRAM COMPLETE: CPT

## 2024-12-07 LAB
ANION GAP SERPL CALC-SCNC: 15 MMOL/L
BUN SERPL-MCNC: 15 MG/DL
CALCIUM SERPL-MCNC: 10.2 MG/DL
CHLORIDE SERPL-SCNC: 101 MMOL/L
CHOLEST SERPL-MCNC: 156 MG/DL
CO2 SERPL-SCNC: 24 MMOL/L
CREAT SERPL-MCNC: 1.07 MG/DL
EGFR: 75 ML/MIN/1.73M2
ESTIMATED AVERAGE GLUCOSE: 128 MG/DL
GLUCOSE SERPL-MCNC: 141 MG/DL
HBA1C MFR BLD HPLC: 6.1 %
HCT VFR BLD CALC: 45.2 %
HDLC SERPL-MCNC: 53 MG/DL
HGB BLD-MCNC: 15.3 G/DL
LDLC SERPL CALC-MCNC: 75 MG/DL
NONHDLC SERPL-MCNC: 103 MG/DL
POTASSIUM SERPL-SCNC: 4.4 MMOL/L
SODIUM SERPL-SCNC: 140 MMOL/L
TRIGL SERPL-MCNC: 165 MG/DL

## 2024-12-09 PROBLEM — Z01.89 LABORATORY TEST: Status: RESOLVED | Noted: 2023-12-13 | Resolved: 2024-12-09

## 2024-12-19 ENCOUNTER — RX RENEWAL (OUTPATIENT)
Age: 69
End: 2024-12-19

## 2025-04-17 ENCOUNTER — RX RENEWAL (OUTPATIENT)
Age: 70
End: 2025-04-17

## 2025-06-18 ENCOUNTER — RX RENEWAL (OUTPATIENT)
Age: 70
End: 2025-06-18

## 2025-08-29 ENCOUNTER — APPOINTMENT (OUTPATIENT)
Dept: HEART AND VASCULAR | Facility: CLINIC | Age: 70
End: 2025-08-29

## 2025-09-05 ENCOUNTER — LABORATORY RESULT (OUTPATIENT)
Age: 70
End: 2025-09-05

## 2025-09-08 ENCOUNTER — TRANSCRIPTION ENCOUNTER (OUTPATIENT)
Age: 70
End: 2025-09-08

## 2025-09-10 ENCOUNTER — APPOINTMENT (OUTPATIENT)
Dept: HEART AND VASCULAR | Facility: CLINIC | Age: 70
End: 2025-09-10
Payer: MEDICARE

## 2025-09-10 VITALS
OXYGEN SATURATION: 96 % | SYSTOLIC BLOOD PRESSURE: 155 MMHG | WEIGHT: 210 LBS | HEIGHT: 70 IN | DIASTOLIC BLOOD PRESSURE: 85 MMHG | HEART RATE: 74 BPM | BODY MASS INDEX: 30.06 KG/M2

## 2025-09-10 DIAGNOSIS — I77.810 THORACIC AORTIC ECTASIA: ICD-10-CM

## 2025-09-10 DIAGNOSIS — Z00.00 ENCOUNTER FOR GENERAL ADULT MEDICAL EXAMINATION W/OUT ABNORMAL FINDINGS: ICD-10-CM

## 2025-09-10 DIAGNOSIS — I11.9 HYPERTENSIVE HEART DISEASE W/OUT HEART FAILURE: ICD-10-CM

## 2025-09-10 PROCEDURE — G0439: CPT

## 2025-09-10 PROCEDURE — 93000 ELECTROCARDIOGRAM COMPLETE: CPT

## 2025-09-10 PROCEDURE — 93306 TTE W/DOPPLER COMPLETE: CPT

## 2025-09-10 RX ORDER — ACETAMINOPHEN, CHLORPHENIRAMINE MALEATE, PHENYLEPHRINE HCL 325; 2; 5 MG/1; MG/1; MG/1
2-5-325 TABLET ORAL
Refills: 0 | Status: ACTIVE | COMMUNITY
Start: 2025-09-10

## 2025-09-10 RX ORDER — FEXOFENADINE HCL 60 MG
60 CAPSULE ORAL
Refills: 0 | Status: ACTIVE | COMMUNITY

## 2025-09-10 RX ORDER — MONTELUKAST 10 MG/1
10 TABLET, FILM COATED ORAL
Refills: 0 | Status: ACTIVE | COMMUNITY
Start: 2025-09-10

## 2025-09-11 ENCOUNTER — TRANSCRIPTION ENCOUNTER (OUTPATIENT)
Age: 70
End: 2025-09-11

## 2025-09-11 DIAGNOSIS — E66.3 OVERWEIGHT: ICD-10-CM

## 2025-09-11 RX ORDER — METFORMIN HYDROCHLORIDE 500 MG/1
500 TABLET, COATED ORAL
Qty: 90 | Refills: 3 | Status: ACTIVE | COMMUNITY
Start: 2025-09-11

## 2025-09-12 ENCOUNTER — TRANSCRIPTION ENCOUNTER (OUTPATIENT)
Age: 70
End: 2025-09-12

## 2025-09-12 RX ORDER — METFORMIN HYDROCHLORIDE 500 MG/1
500 TABLET, COATED ORAL
Qty: 90 | Refills: 3 | Status: ACTIVE | COMMUNITY
Start: 2025-09-12 | End: 1900-01-01

## 2025-09-18 ENCOUNTER — TRANSCRIPTION ENCOUNTER (OUTPATIENT)
Age: 70
End: 2025-09-18

## 2025-09-18 RX ORDER — VALSARTAN 320 MG/1
320 TABLET, COATED ORAL
Qty: 90 | Refills: 3 | Status: ACTIVE | COMMUNITY
Start: 2025-09-10 | End: 1900-01-01

## 2025-09-19 ENCOUNTER — TRANSCRIPTION ENCOUNTER (OUTPATIENT)
Age: 70
End: 2025-09-19